# Patient Record
Sex: MALE | Race: WHITE | Employment: FULL TIME | URBAN - METROPOLITAN AREA
[De-identification: names, ages, dates, MRNs, and addresses within clinical notes are randomized per-mention and may not be internally consistent; named-entity substitution may affect disease eponyms.]

---

## 2017-10-12 ENCOUNTER — TELEPHONE (OUTPATIENT)
Dept: FAMILY MEDICINE CLINIC | Age: 32
End: 2017-10-12

## 2017-10-12 NOTE — TELEPHONE ENCOUNTER
Carlos's mom is calling in regarding scheduling a new patient appt with Dr Lupis Heredia. She said that he had seen her in the past before she was at the [address] [place] [address], [place] [address] of [facility] location. Mom, dad, sister and grandmother are all established patient. He is needing to be seen for depression and an ingrown toenail. She is wanting to know if he could be seen next week.

## 2017-10-20 ENCOUNTER — OFFICE VISIT (OUTPATIENT)
Dept: FAMILY MEDICINE CLINIC | Age: 32
End: 2017-10-20
Payer: COMMERCIAL

## 2017-10-20 VITALS
RESPIRATION RATE: 16 BRPM | DIASTOLIC BLOOD PRESSURE: 66 MMHG | HEART RATE: 96 BPM | TEMPERATURE: 97.8 F | SYSTOLIC BLOOD PRESSURE: 108 MMHG | WEIGHT: 150.8 LBS

## 2017-10-20 DIAGNOSIS — R53.83 OTHER FATIGUE: ICD-10-CM

## 2017-10-20 DIAGNOSIS — F40.10 SOCIAL ANXIETY DISORDER: Primary | ICD-10-CM

## 2017-10-20 DIAGNOSIS — L60.0 INGROWN TOENAIL: ICD-10-CM

## 2017-10-20 DIAGNOSIS — F32.A DEPRESSION, UNSPECIFIED DEPRESSION TYPE: ICD-10-CM

## 2017-10-20 PROCEDURE — 99204 OFFICE O/P NEW MOD 45 MIN: CPT | Performed by: FAMILY MEDICINE

## 2017-10-22 ASSESSMENT — ENCOUNTER SYMPTOMS
ABDOMINAL PAIN: 0
CHEST TIGHTNESS: 0
BLOOD IN STOOL: 0
EYES NEGATIVE: 1
SHORTNESS OF BREATH: 0

## 2017-10-22 NOTE — PROGRESS NOTES
Chief Complaint   Patient presents with   Rito Vu Doctor     previous physican Dr Nestor Simmons years ago,     Depression     wondering about meds    Ingrown Toenail     better now. Kathrin Newman is a 28 y.o.male      Pt presents to re-establish care. Hasn't been seen here in over 5 years. C/o depression and social anxiety. Works at [employer] and lives at home with his parents. Doesn't enjoy doing anything and keeps to himself. Doesn't interact with friends much. Worries that if he interacts with other people that they will expect things from him, so he avoids forming relationships. Strong family h/o anxiety and depression in mom, dad, sib. He gets very anxious when he needs to communicate with others. He tells of a recent experience where he was very distressed about the process of getting a passport for travel. He denies suicidal thinking. He also c/o an ingrown toenail, which he has treated himself. Was infected, but that is now resolved after using peroxide and soaking the toe. C/o excessive fatigue. Nonsmoker. Denies drug use. There is no height or weight on file to calculate BMI. Current medical problems include: There is no problem list on file for this patient. History reviewed. No pertinent past medical history. History reviewed. No pertinent surgical history. No Known Allergies     No outpatient prescriptions prior to visit. No facility-administered medications prior to visit. Family History   Problem Relation Age of Onset    Mental Illness Mother     Mental Illness Father     Mental Illness Sister          Social History   Substance Use Topics    Smoking status: Never Smoker    Smokeless tobacco: Never Used    Alcohol use Yes      Comment: daily     Single. Works at [employer]. Lives with mom,dad. Review of Systems   Constitutional: Positive for fatigue. Negative for fever and unexpected weight change.    HENT: Negative. Eyes: Negative. Respiratory: Negative for chest tightness and shortness of breath. Cardiovascular: Negative for chest pain and leg swelling. Gastrointestinal: Negative for abdominal pain and blood in stool. Genitourinary: Negative. Musculoskeletal: Negative. Skin: Negative for rash. Neurological: Negative for dizziness and headaches. Psychiatric/Behavioral: Positive for dysphoric mood. Negative for sleep disturbance and suicidal ideas. The patient is nervous/anxious. All other systems reviewed and are negative. OBJECTIVE     /66   Pulse 96   Temp 97.8 °F (36.6 °C) (Oral)   Resp 16   Wt 150 lb 12.8 oz (68.4 kg)     Physical Exam   Constitutional: He is oriented to person, place, and time. He appears well-developed and well-nourished. HENT:   Head: Normocephalic and atraumatic. Right Ear: Tympanic membrane normal.   Left Ear: Tympanic membrane normal.   Mouth/Throat: Oropharynx is clear and moist.   Eyes: Conjunctivae are normal.   Neck: Neck supple. Carotid bruit is not present. No thyromegaly present. Cardiovascular: Normal rate, regular rhythm and normal heart sounds. No murmur heard. Pulmonary/Chest: Effort normal and breath sounds normal. He has no wheezes. Abdominal: Soft. Bowel sounds are normal. There is no tenderness. Musculoskeletal: He exhibits no edema. Feet:    Lymphadenopathy:     He has no cervical adenopathy. Neurological: He is alert and oriented to person, place, and time. Skin: Skin is warm and dry. No rash noted. Psychiatric: He has a normal mood and affect. His behavior is normal.   Vitals reviewed. There is no immunization history on file for this patient. Health Maintenance   Topic Date Due    HIV screen  11/21/2001    DTaP/Tdap/Td vaccine (1 - Tdap) 11/21/2005    Flu vaccine (1) 09/01/2017         ASSESSMENT      1. Social anxiety disorder    2. Other fatigue    3.  Depression, unspecified depression

## 2017-11-12 LAB
ABSOLUTE BASO #: 0 K/UL (ref 0–0.1)
ABSOLUTE EOS #: 0.1 K/UL (ref 0.1–0.4)
ABSOLUTE LYMPH #: 1.6 K/UL (ref 0.8–5.2)
ABSOLUTE MONO #: 0.4 K/UL (ref 0.1–0.9)
ABSOLUTE NEUT #: 3.2 K/UL (ref 1.3–9.1)
ALBUMIN SERPL-MCNC: 4.5 G/DL (ref 3.2–5.3)
ALK PHOSPHATASE: 83 IU/L (ref 35–121)
ALT SERPL-CCNC: 18 IU/L (ref 5–59)
ANION GAP SERPL CALCULATED.3IONS-SCNC: 13 MMOL/L
AST SERPL-CCNC: 18 IU/L (ref 10–42)
BASOPHILS RELATIVE PERCENT: 0.6 %
BILIRUB SERPL-MCNC: 1.2 MG/DL (ref 0.2–1.3)
BUN BLDV-MCNC: 13 MG/DL (ref 10–20)
CALCIUM SERPL-MCNC: 9.4 MG/DL (ref 8.7–10.8)
CHLORIDE BLD-SCNC: 103 MMOL/L (ref 95–111)
CO2: 29 MMOL/L (ref 21–32)
CREAT SERPL-MCNC: 1.1 MG/DL (ref 0.5–1.3)
EGFR AFRICAN AMERICAN: 95
EGFR IF NONAFRICAN AMERICAN: 79
EOSINOPHILS RELATIVE PERCENT: 1.7 %
GLUCOSE: 88 MG/DL (ref 70–100)
HCT VFR BLD CALC: 47.8 % (ref 41.4–51)
HEMOGLOBIN: 16.4 G/DL (ref 13.8–17)
LYMPHOCYTE %: 30.3 %
MCH RBC QN AUTO: 32.1 PG (ref 27–34)
MCHC RBC AUTO-ENTMCNC: 34.3 G/DL (ref 31–36)
MCV RBC AUTO: 93.5 FL (ref 80–100)
MONOCYTES # BLD: 8 %
NEUTROPHILS RELATIVE PERCENT: 59.2 %
PDW BLD-RTO: 11.8 % (ref 10.8–14.8)
PLATELETS: 224 K/UL (ref 150–450)
POTASSIUM SERPL-SCNC: 3.8 MMOL/L (ref 3.5–5.4)
RBC: 5.11 M/UL (ref 4–5.5)
SODIUM BLD-SCNC: 141 MMOL/L (ref 134–147)
TOTAL PROTEIN: 6.5 G/DL (ref 5.8–8)
WBC: 5.4 K/UL (ref 3.7–10.8)

## 2017-11-13 LAB
T4 FREE: 1.12 NG/DL (ref 0.8–1.8)
TSH SERPL DL<=0.05 MIU/L-ACNC: 1.13 UIU/ML (ref 0.4–4.4)

## 2017-11-17 ENCOUNTER — OFFICE VISIT (OUTPATIENT)
Dept: FAMILY MEDICINE CLINIC | Age: 32
End: 2017-11-17
Payer: COMMERCIAL

## 2017-11-17 VITALS
HEART RATE: 80 BPM | DIASTOLIC BLOOD PRESSURE: 70 MMHG | RESPIRATION RATE: 16 BRPM | SYSTOLIC BLOOD PRESSURE: 108 MMHG | WEIGHT: 151.6 LBS | TEMPERATURE: 98.5 F

## 2017-11-17 DIAGNOSIS — F40.10 SOCIAL ANXIETY DISORDER: ICD-10-CM

## 2017-11-17 DIAGNOSIS — F32.A DEPRESSION, UNSPECIFIED DEPRESSION TYPE: ICD-10-CM

## 2017-11-17 PROCEDURE — 99213 OFFICE O/P EST LOW 20 MIN: CPT | Performed by: FAMILY MEDICINE

## 2017-11-17 RX ORDER — SERTRALINE HYDROCHLORIDE 100 MG/1
100 TABLET, FILM COATED ORAL DAILY
Qty: 30 TABLET | Refills: 5 | Status: SHIPPED | OUTPATIENT
Start: 2017-11-17 | End: 2018-08-07 | Stop reason: SDUPTHER

## 2017-11-17 NOTE — PROGRESS NOTES
Chief Complaint   Patient presents with    Depression     4 week check up,          Yoselyn Quinteros is a 28 y.o.male      Pt here for follow up of depression and social anxiety. He has not really noticed much of an improvement with the zoloft so far. He denies side effects with it. He continues to decline counseling. He admits that he has not made an effort to get out of the house or socialize other than what he has to do for work. He would like to increase the dose of his medication some. Nonsmoker. Review of Systems   Constitutional: Negative. HENT: Negative. Respiratory: Negative. Cardiovascular: Negative. Gastrointestinal: Negative. Psychiatric/Behavioral: Positive for dysphoric mood and sleep disturbance. Negative for suicidal ideas. The patient is nervous/anxious. All other systems reviewed and are negative. OBJECTIVE     /70   Pulse 80   Temp 98.5 °F (36.9 °C) (Oral)   Resp 16   Wt 151 lb 9.6 oz (68.8 kg)      Wt Readings from Last 3 Encounters:   11/17/17 151 lb 9.6 oz (68.8 kg)   10/20/17 150 lb 12.8 oz (68.4 kg)         Physical Exam   Psychiatric: His speech is normal and behavior is normal. Judgment and thought content normal. His affect is blunt. He exhibits a depressed mood. ASSESSMENT      1. Social anxiety disorder    2. Depression, unspecified depression type        PLAN     Requested Prescriptions     Signed Prescriptions Disp Refills    sertraline (ZOLOFT) 100 MG tablet 30 tablet 5     Sig: Take 1 tablet by mouth daily     Increase zoloft to 100mg daily    He declines formal counseling. Greater than 50% of this 15 min visit spent on counseling and coordination of care. I encouraged him to try to get out of the house a little more. Return in about 4 weeks (around 12/15/2017).               Electronically signed by Maya Whalen MD on 11/17/2017 at 4:58 PM

## 2017-11-19 ASSESSMENT — ENCOUNTER SYMPTOMS
GASTROINTESTINAL NEGATIVE: 1
RESPIRATORY NEGATIVE: 1

## 2018-01-05 ENCOUNTER — OFFICE VISIT (OUTPATIENT)
Dept: FAMILY MEDICINE CLINIC | Age: 33
End: 2018-01-05
Payer: COMMERCIAL

## 2018-01-05 VITALS
WEIGHT: 148 LBS | TEMPERATURE: 98.4 F | HEART RATE: 80 BPM | RESPIRATION RATE: 16 BRPM | DIASTOLIC BLOOD PRESSURE: 84 MMHG | SYSTOLIC BLOOD PRESSURE: 118 MMHG

## 2018-01-05 DIAGNOSIS — F32.A DEPRESSION, UNSPECIFIED DEPRESSION TYPE: ICD-10-CM

## 2018-01-05 DIAGNOSIS — F40.10 SOCIAL ANXIETY DISORDER: Primary | ICD-10-CM

## 2018-01-05 PROCEDURE — 99213 OFFICE O/P EST LOW 20 MIN: CPT | Performed by: FAMILY MEDICINE

## 2018-01-05 RX ORDER — ASPIRIN 325 MG
325 TABLET ORAL PRN
COMMUNITY

## 2018-01-05 ASSESSMENT — PATIENT HEALTH QUESTIONNAIRE - PHQ9
2. FEELING DOWN, DEPRESSED OR HOPELESS: 1
SUM OF ALL RESPONSES TO PHQ9 QUESTIONS 1 & 2: 2
SUM OF ALL RESPONSES TO PHQ QUESTIONS 1-9: 2
1. LITTLE INTEREST OR PLEASURE IN DOING THINGS: 1

## 2018-01-05 NOTE — PROGRESS NOTES
Chief Complaint   Patient presents with    1 Month Follow-Up     Here today for 1 month f/up for social anxiety and depression. Pt states the increased dose of Zoloft is helping. Soto Shah is a 33 y.o.male      Here for follow up of chronic health problems including:    Patient Active Problem List   Diagnosis    Social anxiety disorder    Depression     He feels like the zoloft is helping for his anxiety and depression. Has a little more motivation and his mood is improved some. Denies side effects from the meds. Socializing with family a little more. Sleeps ok. Would like to continue the med for now. Declines referral for formalized counseling. Review of Systems   Constitutional: Negative for appetite change and unexpected weight change. HENT: Negative. Respiratory: Negative. Cardiovascular: Negative. Gastrointestinal: Negative. Neurological: Negative for dizziness and headaches. Psychiatric/Behavioral: Positive for agitation (improved) and dysphoric mood. Negative for sleep disturbance and suicidal ideas. The patient is nervous/anxious (improved). All other systems reviewed and are negative. OBJECTIVE     /84 (Site: Left Arm)   Pulse 80   Temp 98.4 °F (36.9 °C) (Oral)   Resp 16   Wt 148 lb (67.1 kg)     Wt Readings from Last 3 Encounters:   01/05/18 148 lb (67.1 kg)   11/17/17 151 lb 9.6 oz (68.8 kg)   10/20/17 150 lb 12.8 oz (68.4 kg)       Physical Exam   Constitutional: He appears well-developed and well-nourished. HENT:   Right Ear: Tympanic membrane normal.   Left Ear: Tympanic membrane normal.   Mouth/Throat: Oropharynx is clear and moist.   Cardiovascular: Normal rate and regular rhythm. No murmur heard. Pulmonary/Chest: Breath sounds normal. He has no wheezes. Musculoskeletal: He exhibits no edema. Lymphadenopathy:     He has no cervical adenopathy.                There is no immunization history on file for this patient. Health Maintenance   Topic Date Due    HIV screen  11/21/2001    DTaP/Tdap/Td vaccine (1 - Tdap) 11/21/2005    Flu vaccine (1) 09/01/2017       Future Appointments  Date Time Provider Marjorie Davies   7/10/2018 3:15 PM Sergio Ho MD [address]      1. Social anxiety disorder    2. Depression, unspecified depression type        PLAN        Continue Zoloft 100mg daily    Increase exercise and activity. Work on getting out of the house a little more. Kyaw Sabillon received counseling on the following healthy behaviors: medication adherence     Discussed use, benefit, and side effects of prescribed medications. Barriers to medication compliance addressed. All patient questions answered. Pt voiced understanding. Return in about 6 months (around 7/5/2018).           Electronically signed by Sergio Ho MD on 1/5/2018 at 4:57 PM

## 2018-01-06 ASSESSMENT — ENCOUNTER SYMPTOMS
GASTROINTESTINAL NEGATIVE: 1
RESPIRATORY NEGATIVE: 1

## 2018-08-07 ENCOUNTER — OFFICE VISIT (OUTPATIENT)
Dept: FAMILY MEDICINE CLINIC | Age: 33
End: 2018-08-07
Payer: COMMERCIAL

## 2018-08-07 VITALS
RESPIRATION RATE: 16 BRPM | DIASTOLIC BLOOD PRESSURE: 76 MMHG | SYSTOLIC BLOOD PRESSURE: 116 MMHG | TEMPERATURE: 98.5 F | HEART RATE: 88 BPM | WEIGHT: 145.6 LBS

## 2018-08-07 DIAGNOSIS — F32.A DEPRESSION, UNSPECIFIED DEPRESSION TYPE: ICD-10-CM

## 2018-08-07 DIAGNOSIS — F40.10 SOCIAL ANXIETY DISORDER: ICD-10-CM

## 2018-08-07 PROCEDURE — 99213 OFFICE O/P EST LOW 20 MIN: CPT | Performed by: FAMILY MEDICINE

## 2018-08-07 RX ORDER — SERTRALINE HYDROCHLORIDE 100 MG/1
150 TABLET, FILM COATED ORAL DAILY
Qty: 135 TABLET | Refills: 3 | Status: SHIPPED | OUTPATIENT
Start: 2018-08-07 | End: 2018-11-13

## 2018-08-07 ASSESSMENT — ENCOUNTER SYMPTOMS
RESPIRATORY NEGATIVE: 1
GASTROINTESTINAL NEGATIVE: 1

## 2018-08-08 NOTE — PROGRESS NOTES
Ida Pierson MD [address]      1. Social anxiety disorder    2. Depression, unspecified depression type        PLAN        Requested Prescriptions     Signed Prescriptions Disp Refills    sertraline (ZOLOFT) 100 MG tablet 135 tablet 3     Sig: Take 1.5 tablets by mouth daily     Increase zoloft as above    Recommended healthy diet, exercise and adequate rest    Increase socialization some    Anna Gut received counseling on the following healthy behaviors: nutrition, exercise and medication adherence. Discussed use, benefit, and side effects of prescribed medications. Barriers to medication compliance addressed. All patient questions answered. Pt voiced understanding. Greater than 50% of this 15 min visit spent on counseling and coordination of care. Return in about 6 months (around 2/7/2019).           Electronically signed by Ida Pierson MD on 8/7/2018 at 8:29 PM

## 2018-08-18 DIAGNOSIS — F40.10 SOCIAL ANXIETY DISORDER: ICD-10-CM

## 2018-08-18 DIAGNOSIS — F32.A DEPRESSION, UNSPECIFIED DEPRESSION TYPE: ICD-10-CM

## 2018-08-20 RX ORDER — SERTRALINE HYDROCHLORIDE 100 MG/1
100 TABLET, FILM COATED ORAL DAILY
Qty: 30 TABLET | Refills: 5 | OUTPATIENT
Start: 2018-08-20

## 2018-11-13 ENCOUNTER — OFFICE VISIT (OUTPATIENT)
Dept: FAMILY MEDICINE CLINIC | Age: 33
End: 2018-11-13
Payer: COMMERCIAL

## 2018-11-13 VITALS
WEIGHT: 151 LBS | TEMPERATURE: 98.1 F | RESPIRATION RATE: 16 BRPM | DIASTOLIC BLOOD PRESSURE: 84 MMHG | HEART RATE: 80 BPM | SYSTOLIC BLOOD PRESSURE: 110 MMHG

## 2018-11-13 DIAGNOSIS — F40.10 SOCIAL ANXIETY DISORDER: ICD-10-CM

## 2018-11-13 DIAGNOSIS — R53.83 OTHER FATIGUE: ICD-10-CM

## 2018-11-13 DIAGNOSIS — F32.A DEPRESSION, UNSPECIFIED DEPRESSION TYPE: Primary | ICD-10-CM

## 2018-11-13 PROCEDURE — 99213 OFFICE O/P EST LOW 20 MIN: CPT | Performed by: FAMILY MEDICINE

## 2018-11-13 RX ORDER — VENLAFAXINE HYDROCHLORIDE 75 MG/1
75 CAPSULE, EXTENDED RELEASE ORAL DAILY
Qty: 30 CAPSULE | Refills: 5 | Status: SHIPPED | OUTPATIENT
Start: 2018-11-13 | End: 2019-01-22 | Stop reason: ALTCHOICE

## 2018-11-14 ASSESSMENT — ENCOUNTER SYMPTOMS
RESPIRATORY NEGATIVE: 1
GASTROINTESTINAL NEGATIVE: 1

## 2018-11-14 NOTE — PROGRESS NOTES
CBC, CMP, TSH, free T4, testosterone level. Return in about 6 weeks (around 12/25/2018).               Electronically signed by Vika Ricci MD on 11/13/2018 at 11:18 PM

## 2018-12-19 ENCOUNTER — TELEPHONE (OUTPATIENT)
Dept: FAMILY MEDICINE CLINIC | Age: 33
End: 2018-12-19

## 2018-12-19 NOTE — TELEPHONE ENCOUNTER
Patient is not happy with new medication, seeing if he could go back on previous medication? Patient still has some from before and has some till next appt. Patient has appt 1/22/19, r/s from 12/21/18    Please advise, thank you!

## 2018-12-19 NOTE — TELEPHONE ENCOUNTER
Ok to go back to zoloft 50mg daily. Follow up with me as planned. Call for any problems in the meantime.   CG

## 2019-01-22 ENCOUNTER — OFFICE VISIT (OUTPATIENT)
Dept: FAMILY MEDICINE CLINIC | Age: 34
End: 2019-01-22
Payer: COMMERCIAL

## 2019-01-22 VITALS
WEIGHT: 150.8 LBS | TEMPERATURE: 98.5 F | BODY MASS INDEX: 21.11 KG/M2 | RESPIRATION RATE: 16 BRPM | DIASTOLIC BLOOD PRESSURE: 82 MMHG | SYSTOLIC BLOOD PRESSURE: 120 MMHG | HEART RATE: 88 BPM | HEIGHT: 71 IN

## 2019-01-22 DIAGNOSIS — F32.A DEPRESSION, UNSPECIFIED DEPRESSION TYPE: ICD-10-CM

## 2019-01-22 DIAGNOSIS — F40.10 SOCIAL ANXIETY DISORDER: Primary | ICD-10-CM

## 2019-01-22 PROCEDURE — 99213 OFFICE O/P EST LOW 20 MIN: CPT | Performed by: FAMILY MEDICINE

## 2019-01-22 RX ORDER — SERTRALINE HYDROCHLORIDE 100 MG/1
150 TABLET, FILM COATED ORAL DAILY
COMMUNITY
End: 2019-02-20 | Stop reason: SDUPTHER

## 2019-01-23 ASSESSMENT — ENCOUNTER SYMPTOMS
RESPIRATORY NEGATIVE: 1
GASTROINTESTINAL NEGATIVE: 1

## 2019-02-22 RX ORDER — SERTRALINE HYDROCHLORIDE 100 MG/1
150 TABLET, FILM COATED ORAL DAILY
Qty: 135 TABLET | Refills: 3 | Status: SHIPPED | OUTPATIENT
Start: 2019-02-22 | End: 2020-02-27 | Stop reason: SDUPTHER

## 2019-07-29 ENCOUNTER — TELEPHONE (OUTPATIENT)
Dept: FAMILY MEDICINE CLINIC | Age: 34
End: 2019-07-29

## 2019-07-29 ENCOUNTER — OFFICE VISIT (OUTPATIENT)
Dept: FAMILY MEDICINE CLINIC | Age: 34
End: 2019-07-29
Payer: COMMERCIAL

## 2019-07-29 VITALS
HEART RATE: 92 BPM | RESPIRATION RATE: 16 BRPM | SYSTOLIC BLOOD PRESSURE: 124 MMHG | DIASTOLIC BLOOD PRESSURE: 78 MMHG | BODY MASS INDEX: 21.5 KG/M2 | WEIGHT: 152 LBS

## 2019-07-29 DIAGNOSIS — Z56.6 WORK-RELATED STRESS: ICD-10-CM

## 2019-07-29 DIAGNOSIS — F41.9 ANXIETY: Primary | ICD-10-CM

## 2019-07-29 PROCEDURE — 99214 OFFICE O/P EST MOD 30 MIN: CPT | Performed by: FAMILY MEDICINE

## 2019-07-29 SDOH — HEALTH STABILITY - MENTAL HEALTH: OTHER PHYSICAL AND MENTAL STRAIN RELATED TO WORK: Z56.6

## 2019-07-29 ASSESSMENT — ENCOUNTER SYMPTOMS
VOMITING: 1
RESPIRATORY NEGATIVE: 1

## 2019-07-29 NOTE — TELEPHONE ENCOUNTER
Spoke to the pts mother and scheduled the pt an appt with CG today at 2:45 PM in [facility]. His mother had an appt at that time and she rescheduled her appt so the pt could be seen.

## 2019-07-30 ENCOUNTER — TELEPHONE (OUTPATIENT)
Dept: FAMILY MEDICINE CLINIC | Age: 34
End: 2019-07-30

## 2019-07-30 NOTE — PROGRESS NOTES
Chief Complaint   Patient presents with    Anxiety     increased anx and stress from work         Xenia Santacruz is a 29 y.o.male      Pt here with his mother today due to worsening stress and anxiety related to work. 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 He left work last week and was so anxious that he vomited all night at home. He has a h/o significant social anxiety. He admits that he hasn't consistently taken his zoloft recently. He has not been back to work since this happened late last week. He isn't sure that he's going to be able to do the job that they want him to do at work. He is willing to do some counseling. There is a strong family history of anxiety, depression and mental illness in the family. Nonsmoker. Body mass index is 21.5 kg/m². Review of Systems   Constitutional: Negative. HENT: Negative. Respiratory: Negative. Cardiovascular: Negative. Gastrointestinal: Positive for vomiting. Genitourinary: Negative. Musculoskeletal: Negative. Psychiatric/Behavioral: Positive for behavioral problems and dysphoric mood. Negative for suicidal ideas. The patient is nervous/anxious. All other systems reviewed and are negative. OBJECTIVE     /78   Pulse 92   Resp 16   Wt 152 lb (68.9 kg)   BMI 21.50 kg/m²       Wt Readings from Last 3 Encounters:   07/29/19 152 lb (68.9 kg)   01/22/19 150 lb 12.8 oz (68.4 kg)   11/13/18 151 lb (68.5 kg)       Physical Exam   Constitutional: He appears well-developed and well-nourished. Psychiatric: Judgment and thought content normal. His mood appears anxious. His speech is delayed. He is slowed. Cognition and memory are normal. He exhibits a depressed mood. ASSESSMENT      1. Anxiety    2. Work-related stress        PLAN     Refer to psychology    Off work x 1 week. He will address his work situation with his supervisor.     Take zoloft consistently      Orders Placed This Encounter   Procedures    Amb External Referral To Psychology     Referral Priority:   Routine     Referral Type:   Consult for Advice and Opinion     Requested Specialty:   Psychology     Number of Visits Requested:   1       Return in about 1 week (around 8/5/2019). Greater than 50% of this 25 min visit spent on counseling and coordination of care.               Electronically signed by Sean Hall MD on 7/29/2019 at 8:45 PM

## 2019-07-30 NOTE — TELEPHONE ENCOUNTER
Diana Edwards calling stating she will contact the pt to schedule an appt. Please fax over notes to [phone]. Notes and referral faxed.

## 2019-08-05 ENCOUNTER — OFFICE VISIT (OUTPATIENT)
Dept: FAMILY MEDICINE CLINIC | Age: 34
End: 2019-08-05
Payer: COMMERCIAL

## 2019-08-05 VITALS
RESPIRATION RATE: 12 BRPM | WEIGHT: 151 LBS | SYSTOLIC BLOOD PRESSURE: 134 MMHG | DIASTOLIC BLOOD PRESSURE: 72 MMHG | BODY MASS INDEX: 21.36 KG/M2 | HEART RATE: 86 BPM

## 2019-08-05 DIAGNOSIS — F41.9 ANXIETY: Primary | ICD-10-CM

## 2019-08-05 DIAGNOSIS — F40.10 SOCIAL ANXIETY DISORDER: ICD-10-CM

## 2019-08-05 DIAGNOSIS — Z56.6 WORK-RELATED STRESS: ICD-10-CM

## 2019-08-05 PROCEDURE — 99213 OFFICE O/P EST LOW 20 MIN: CPT | Performed by: FAMILY MEDICINE

## 2019-08-05 RX ORDER — BUSPIRONE HYDROCHLORIDE 10 MG/1
10 TABLET ORAL 2 TIMES DAILY
Qty: 60 TABLET | Refills: 2 | Status: SHIPPED | OUTPATIENT
Start: 2019-08-05 | End: 2019-10-21 | Stop reason: SDUPTHER

## 2019-08-05 SDOH — HEALTH STABILITY - MENTAL HEALTH: OTHER PHYSICAL AND MENTAL STRAIN RELATED TO WORK: Z56.6

## 2019-08-05 ASSESSMENT — ENCOUNTER SYMPTOMS
RESPIRATORY NEGATIVE: 1
GASTROINTESTINAL NEGATIVE: 1

## 2019-08-05 NOTE — PROGRESS NOTES
Chief Complaint   Patient presents with    Anxiety     1 week fu, patient still feels like he is struggling with his anxiety         SUBJECTIVE     Fara Santiago is a 29 y.o.male      Pt here for follow up of anxiety and social anxiety. Has started counseling with Swapna Medina and is taking his zoloft daily now. He is not noticing any difference in his anxiety. Unable to sit still today. He is here with his mother who would like for him to take Buspar along with his zoloft. He isn't back to work yet and feels that he isn't ready to do so yet. Needs paperwork filled out again. Review of Systems   Constitutional: Negative for fatigue, fever and unexpected weight change. HENT: Negative. Respiratory: Negative. Cardiovascular: Negative. Gastrointestinal: Negative. Psychiatric/Behavioral: Positive for dysphoric mood. The patient is nervous/anxious. All other systems reviewed and are negative. OBJECTIVE     /72   Pulse 86   Resp 12   Wt 151 lb (68.5 kg)   BMI 21.36 kg/m²     Physical Exam   Constitutional: He appears well-developed and well-nourished. Psychiatric: Judgment and thought content normal. His mood appears anxious. His speech is delayed. He is hyperactive. Cognition and memory are normal.           ASSESSMENT      1. Anxiety    2. Social anxiety disorder    3. Work-related stress        PLAN     Requested Prescriptions     Signed Prescriptions Disp Refills    busPIRone (BUSPAR) 10 MG tablet 60 tablet 2     Sig: Take 1 tablet by mouth 2 times daily     Continue Zoloft at current dose    Continue weekly counseling    Off work for 2 more weeks. Will need referral to psychiatry is not improving soon. Return in about 2 weeks (around 8/19/2019). Greater than 50% of this 15 min visit spent on counseling and coordination of care.               Electronically signed by Kieran Stoll MD on 8/5/2019 at 4:58 PM

## 2019-08-14 ENCOUNTER — TELEPHONE (OUTPATIENT)
Dept: FAMILY MEDICINE CLINIC | Age: 34
End: 2019-08-14

## 2019-08-14 NOTE — TELEPHONE ENCOUNTER
Need more information about where he's at with his anxiety before I can write him a return to work slip. He has a scheduled appt for 8/19/19.  CG

## 2019-08-19 ENCOUNTER — OFFICE VISIT (OUTPATIENT)
Dept: FAMILY MEDICINE CLINIC | Age: 34
End: 2019-08-19
Payer: COMMERCIAL

## 2019-08-19 VITALS
DIASTOLIC BLOOD PRESSURE: 74 MMHG | HEART RATE: 76 BPM | HEIGHT: 71 IN | WEIGHT: 155.2 LBS | RESPIRATION RATE: 16 BRPM | SYSTOLIC BLOOD PRESSURE: 120 MMHG | BODY MASS INDEX: 21.73 KG/M2

## 2019-08-19 DIAGNOSIS — F40.10 SOCIAL ANXIETY DISORDER: Primary | ICD-10-CM

## 2019-08-19 PROCEDURE — 99213 OFFICE O/P EST LOW 20 MIN: CPT | Performed by: FAMILY MEDICINE

## 2019-08-19 ASSESSMENT — ENCOUNTER SYMPTOMS
RESPIRATORY NEGATIVE: 1
GASTROINTESTINAL NEGATIVE: 1

## 2019-08-19 NOTE — PROGRESS NOTES
Visit Information    Have you changed or started any medications since your last visit including any over-the-counter medicines, vitamins, or herbal medicines? no   Are you having any side effects from any of your medications? -  no  Have you stopped taking any of your medications? Is so, why? -  no    Have you seen any other physician or provider since your last visit? No  Have you had any other diagnostic tests since your last visit? No  Have you been seen in the emergency room and/or had an admission to a hospital since we last saw you? No  Have you had your routine dental cleaning in the past 6 months? yes - 1/2019    Have you activated your [facility] account? If not, what are your barriers?  Yes     Patient Care Team:  Hieu Gant MD as PCP - General (Family Medicine)  Hieu Gant MD as PCP - [facility] Provider    Medical History Review  Past Medical, Family, and Social History reviewed and does contribute to the patient presenting condition    Health Maintenance   Topic Date Due    Varicella Vaccine (1 of 2 - 13+ 2-dose series) 11/21/1999    HIV screen  11/21/2001    DTaP/Tdap/Td vaccine (1 - Tdap) 11/21/2005    Flu vaccine (1) 10/01/2019 (Originally 9/1/2019)    Pneumococcal 0-64 years Vaccine  Aged Out

## 2019-10-21 ENCOUNTER — OFFICE VISIT (OUTPATIENT)
Dept: FAMILY MEDICINE CLINIC | Age: 34
End: 2019-10-21
Payer: COMMERCIAL

## 2019-10-21 VITALS
DIASTOLIC BLOOD PRESSURE: 70 MMHG | BODY MASS INDEX: 22.37 KG/M2 | WEIGHT: 158.2 LBS | RESPIRATION RATE: 12 BRPM | SYSTOLIC BLOOD PRESSURE: 122 MMHG | HEART RATE: 72 BPM

## 2019-10-21 DIAGNOSIS — Z23 NEED FOR TDAP VACCINATION: ICD-10-CM

## 2019-10-21 DIAGNOSIS — F40.10 SOCIAL ANXIETY DISORDER: Primary | ICD-10-CM

## 2019-10-21 DIAGNOSIS — F41.9 ANXIETY: ICD-10-CM

## 2019-10-21 DIAGNOSIS — Z56.6 WORK-RELATED STRESS: ICD-10-CM

## 2019-10-21 PROCEDURE — 90471 IMMUNIZATION ADMIN: CPT | Performed by: FAMILY MEDICINE

## 2019-10-21 PROCEDURE — 99213 OFFICE O/P EST LOW 20 MIN: CPT | Performed by: FAMILY MEDICINE

## 2019-10-21 PROCEDURE — 90715 TDAP VACCINE 7 YRS/> IM: CPT | Performed by: FAMILY MEDICINE

## 2019-10-21 RX ORDER — BUSPIRONE HYDROCHLORIDE 10 MG/1
10 TABLET ORAL 2 TIMES DAILY
Qty: 180 TABLET | Refills: 3 | Status: SHIPPED | OUTPATIENT
Start: 2019-10-21 | End: 2020-08-25 | Stop reason: SDUPTHER

## 2019-10-21 SDOH — HEALTH STABILITY - MENTAL HEALTH: OTHER PHYSICAL AND MENTAL STRAIN RELATED TO WORK: Z56.6

## 2019-10-21 ASSESSMENT — ENCOUNTER SYMPTOMS
RESPIRATORY NEGATIVE: 1
GASTROINTESTINAL NEGATIVE: 1

## 2020-02-27 RX ORDER — SERTRALINE HYDROCHLORIDE 100 MG/1
150 TABLET, FILM COATED ORAL DAILY
Qty: 135 TABLET | Refills: 2 | Status: SHIPPED | OUTPATIENT
Start: 2020-02-27 | End: 2021-01-07 | Stop reason: SDUPTHER

## 2020-02-27 NOTE — TELEPHONE ENCOUNTER
This medication refill is regarding a refill  Refill requested by [address] [place] [address]     Requested Prescriptions     Pending Prescriptions Disp Refills    sertraline (ZOLOFT) 100 MG tablet 135 tablet 3     Sig: Take 1.5 tablets by mouth daily       Date of last visit: 1/22/2019  Date of next visit: Visit date not found  Date of last refill: 2/22/19  Pharmacy Name: [facility] [address]

## 2020-08-25 RX ORDER — BUSPIRONE HYDROCHLORIDE 10 MG/1
10 TABLET ORAL 2 TIMES DAILY
Qty: 180 TABLET | Refills: 0 | Status: SHIPPED | OUTPATIENT
Start: 2020-08-25 | End: 2021-01-07 | Stop reason: SDUPTHER

## 2020-08-25 NOTE — TELEPHONE ENCOUNTER
This medication refill is regarding a refill  Refill requested by [facility] [address]   Requested Prescriptions     Pending Prescriptions Disp Refills    busPIRone (BUSPAR) 10 MG tablet 180 tablet 3     Sig: Take 1 tablet by mouth 2 times daily       Date of last visit: 1/22/2019  Date of next visit: Visit date not found  Date of last refill: 10/21/20  Pharm[facility] Name: [facility]

## 2020-12-22 RX ORDER — BUSPIRONE HYDROCHLORIDE 10 MG/1
TABLET ORAL
Qty: 180 TABLET | Refills: 0 | OUTPATIENT
Start: 2020-12-22

## 2020-12-22 RX ORDER — SERTRALINE HYDROCHLORIDE 100 MG/1
TABLET, FILM COATED ORAL
Qty: 135 TABLET | Refills: 2 | OUTPATIENT
Start: 2020-12-22

## 2020-12-22 NOTE — TELEPHONE ENCOUNTER
EP refill request from [facility]  for refills on zoloft and buspar  Last seen 10/21/19  Next appt no future   Order pending      Called pt. appt made for 1/7/21.    He states he has enough meds until appt  refill refused

## 2021-01-07 ENCOUNTER — OFFICE VISIT (OUTPATIENT)
Dept: FAMILY MEDICINE CLINIC | Age: 36
End: 2021-01-07
Payer: COMMERCIAL

## 2021-01-07 VITALS
WEIGHT: 161.2 LBS | HEART RATE: 105 BPM | DIASTOLIC BLOOD PRESSURE: 80 MMHG | SYSTOLIC BLOOD PRESSURE: 122 MMHG | TEMPERATURE: 96.4 F | OXYGEN SATURATION: 98 % | BODY MASS INDEX: 22.8 KG/M2

## 2021-01-07 DIAGNOSIS — Z00.00 ANNUAL PHYSICAL EXAM: Primary | ICD-10-CM

## 2021-01-07 DIAGNOSIS — Z56.6 WORK-RELATED STRESS: ICD-10-CM

## 2021-01-07 DIAGNOSIS — F41.9 ANXIETY: ICD-10-CM

## 2021-01-07 DIAGNOSIS — F40.10 SOCIAL ANXIETY DISORDER: ICD-10-CM

## 2021-01-07 PROCEDURE — 99395 PREV VISIT EST AGE 18-39: CPT | Performed by: FAMILY MEDICINE

## 2021-01-07 RX ORDER — BUSPIRONE HYDROCHLORIDE 10 MG/1
10 TABLET ORAL 2 TIMES DAILY
Qty: 180 TABLET | Refills: 3 | Status: SHIPPED | OUTPATIENT
Start: 2021-01-07 | End: 2021-08-20

## 2021-01-07 RX ORDER — SERTRALINE HYDROCHLORIDE 100 MG/1
150 TABLET, FILM COATED ORAL DAILY
Qty: 135 TABLET | Refills: 3 | Status: SHIPPED | OUTPATIENT
Start: 2021-01-07 | End: 2021-08-20

## 2021-01-07 SDOH — HEALTH STABILITY - MENTAL HEALTH: OTHER PHYSICAL AND MENTAL STRAIN RELATED TO WORK: Z56.6

## 2021-01-07 ASSESSMENT — ENCOUNTER SYMPTOMS
EYES NEGATIVE: 1
ABDOMINAL PAIN: 0
BLOOD IN STOOL: 0
SHORTNESS OF BREATH: 0

## 2021-01-07 NOTE — PROGRESS NOTES
2021    Masha Qiu (:  1985) is a 30 y.o. male, here for a preventive medicine evaluation. Patient Active Problem List   Diagnosis    Social anxiety disorder    Depression     Doing well. He states that his depression and anxiety stay pretty stable as long as he sticks to a regular schedule. He takes his medication pretty consistently, but notes that he can sometimes get away with just taking Buspar in the morning. He denies side effects from the meds. He continues to work at [employer]. He is working from home a couple days per week. Not getting much exercise and his weight is up a little. No changes in family history. Nonsmoker. Body mass index is 22.8 kg/m². Review of Systems   Constitutional: Positive for unexpected weight change (gain). Negative for chills, fatigue and fever. HENT: Negative. Negative for hearing loss. Eyes: Negative. Negative for visual disturbance. Respiratory: Negative for shortness of breath. Cardiovascular: Negative. Gastrointestinal: Negative for abdominal pain and blood in stool. Genitourinary: Negative for dysuria. Musculoskeletal: Negative. Skin: Negative for rash. Neurological: Negative for dizziness. Psychiatric/Behavioral: Negative. Negative for dysphoric mood and sleep disturbance. The patient is not nervous/anxious. All other systems reviewed and are negative. Prior to Visit Medications    Medication Sig Taking? Authorizing Provider   busPIRone (BUSPAR) 10 MG tablet Take 1 tablet by mouth 2 times daily Yes Harlan Robin MD   sertraline (ZOLOFT) 100 MG tablet Take 1.5 tablets by mouth daily Yes Harlan Robin MD   aspirin 325 MG tablet Take 325 mg by mouth as needed for Pain Yes Historical Provider, MD        No Known Allergies    History reviewed. No pertinent past medical history. History reviewed. No pertinent surgical history.     Social History     Socioeconomic History    Marital status: Single     Spouse name: Not on file    Number of children: Not on file    Years of education: Not on file    Highest education level: Not on file   Occupational History    Not on file   Social Needs    Financial resource strain: Not on file    Food insecurity     Worry: Not on file     Inability: Not on file    Transportation needs     Medical: Not on file     Non-medical: Not on file   Tobacco Use    Smoking status: Never Smoker    Smokeless tobacco: Never Used   Substance and Sexual Activity    Alcohol use: Yes     Comment: daily    Drug use: No    Sexual activity: Not on file   Lifestyle    Physical activity     Days per week: Not on file     Minutes per session: Not on file    Stress: Not on file   Relationships    Social connections     Talks on phone: Not on file     Gets together: Not on file     Attends Latter-day service: Not on file     Active member of club or organization: Not on file     Attends meetings of clubs or organizations: Not on file     Relationship status: Not on file    Intimate partner violence     Fear of current or ex partner: Not on file     Emotionally abused: Not on file     Physically abused: Not on file     Forced sexual activity: Not on file   Other Topics Concern    Not on file   Social History Narrative    Not on file        Family History   Problem Relation Age of Onset    Mental Illness Mother     Mental Illness Father     Mental Illness Sister        ADVANCE DIRECTIVE: N, <no information>    Vitals:    01/07/21 1450   BP: 122/80   Site: Left Upper Arm   Pulse: 105   Temp: 96.4 °F (35.8 °C)   TempSrc: Temporal   SpO2: 98%   Weight: 161 lb 3.2 oz (73.1 kg)     Estimated body mass index is 22.8 kg/m² as calculated from the following:    Height as of 8/19/19: 5' 10.51\" (1.791 m). Weight as of this encounter: 161 lb 3.2 oz (73.1 kg).     Wt Readings from Last 3 Encounters:   01/07/21 161 lb 3.2 oz (73.1 kg)   10/21/19 158 lb 3.2 oz (71.8 kg)   08/19/19 155 lb 3.2 oz (70.4 kg)       Physical Exam  Vitals signs reviewed. Constitutional:       General: He is not in acute distress. Appearance: He is well-developed. HENT:      Head: Normocephalic and atraumatic. Right Ear: Tympanic membrane normal.      Left Ear: Tympanic membrane normal.      Mouth/Throat:      Mouth: Mucous membranes are moist.      Pharynx: No posterior oropharyngeal erythema. Eyes:      Conjunctiva/sclera: Conjunctivae normal.   Neck:      Musculoskeletal: Neck supple. Thyroid: No thyromegaly. Cardiovascular:      Rate and Rhythm: Normal rate and regular rhythm. Heart sounds: No murmur. Pulmonary:      Effort: Pulmonary effort is normal.      Breath sounds: Normal breath sounds. No wheezing. Abdominal:      General: Bowel sounds are normal.      Palpations: Abdomen is soft. Tenderness: There is no abdominal tenderness. Musculoskeletal:      Right lower leg: No edema. Left lower leg: No edema. Lymphadenopathy:      Cervical: No cervical adenopathy. Skin:     General: Skin is warm and dry. Findings: No rash. Neurological:      Mental Status: He is alert and oriented to person, place, and time.    Psychiatric:         Behavior: Behavior normal.         Immunization History   Administered Date(s) Administered    DTP 01/19/1987, 03/27/1987, 05/22/1987, 05/23/1988, 03/27/1992    Hepatitis B Ped/Adol (Engerix-B, Recombivax HB) 08/26/2004, 10/20/2004, 03/02/2005    Hib vaccine 11/21/1988    Influenza A ([id number]) Vaccine Nasal 01/26/2010    MMR 02/22/1988, 05/18/2000    Polio OPV 01/19/1987, 03/27/1987, 05/22/1987, 03/27/1992    Td vaccine (adult) 06/30/1998    Tdap (Boostrix, Adacel) 10/21/2019       Health Maintenance   Topic Date Due    Hepatitis C screen  1985    HIV screen  11/21/2001    Flu vaccine (1) 01/07/2022 (Originally 9/1/2020)    DTaP/Tdap/Td vaccine (7 - Td) 10/21/2029    Hepatitis B vaccine  Completed    Hib vaccine  Completed  Hepatitis A vaccine  Aged Out    Meningococcal (ACWY) vaccine  Aged Out    Pneumococcal 0-64 years Vaccine  Aged Out    Varicella vaccine  Discontinued       ASSESSMENT/PLAN:    1. Annual physical exam    Suggested healthy diet and exercise to improve overall health. He is at a normal BMI. He declines flu vaccine today. 2. Anxiety    Continue current meds  -     busPIRone (BUSPAR) 10 MG tablet; Take 1 tablet by mouth 2 times daily, Disp-180 tablet, R-3Normal  -     sertraline (ZOLOFT) 100 MG tablet; Take 1.5 tablets by mouth daily, Disp-135 tablet, R-3Normal    3. Social anxiety disorder    Continue current meds  -     busPIRone (BUSPAR) 10 MG tablet; Take 1 tablet by mouth 2 times daily, Disp-180 tablet, R-3Normal  -     sertraline (ZOLOFT) 100 MG tablet; Take 1.5 tablets by mouth daily, Disp-135 tablet, R-3Normal  4. Work-related stress    -     busPIRone (BUSPAR) 10 MG tablet; Take 1 tablet by mouth 2 times daily, Disp-180 tablet, R-3Normal  -     sertraline (ZOLOFT) 100 MG tablet; Take 1.5 tablets by mouth daily, Disp-135 tablet, R-3Normal      Follow up yearly and prn    An electronic signature was used to authenticate this note.         Electronically signed by Stephanie Cote MD on 1/7/2021 at 3:13 PM

## 2021-08-20 ENCOUNTER — OFFICE VISIT (OUTPATIENT)
Dept: FAMILY MEDICINE CLINIC | Age: 36
End: 2021-08-20
Payer: COMMERCIAL

## 2021-08-20 ENCOUNTER — OFFICE VISIT (OUTPATIENT)
Dept: BEHAVIORAL/MENTAL HEALTH CLINIC | Age: 36
End: 2021-08-20
Payer: COMMERCIAL

## 2021-08-20 VITALS
WEIGHT: 156 LBS | SYSTOLIC BLOOD PRESSURE: 114 MMHG | HEIGHT: 70 IN | HEART RATE: 104 BPM | DIASTOLIC BLOOD PRESSURE: 50 MMHG | BODY MASS INDEX: 22.33 KG/M2

## 2021-08-20 DIAGNOSIS — F41.9 ANXIETY: Primary | ICD-10-CM

## 2021-08-20 DIAGNOSIS — Z56.6 WORK-RELATED STRESS: ICD-10-CM

## 2021-08-20 DIAGNOSIS — F40.10 SOCIAL ANXIETY DISORDER: ICD-10-CM

## 2021-08-20 DIAGNOSIS — F41.1 GENERALIZED ANXIETY DISORDER: Primary | ICD-10-CM

## 2021-08-20 PROCEDURE — 99214 OFFICE O/P EST MOD 30 MIN: CPT | Performed by: NURSE PRACTITIONER

## 2021-08-20 PROCEDURE — 90791 PSYCH DIAGNOSTIC EVALUATION: CPT | Performed by: SOCIAL WORKER

## 2021-08-20 RX ORDER — HYDROXYZINE HYDROCHLORIDE 10 MG/1
10-20 TABLET, FILM COATED ORAL EVERY 6 HOURS PRN
Qty: 40 TABLET | Refills: 0 | Status: SHIPPED | OUTPATIENT
Start: 2021-08-20 | End: 2021-09-17 | Stop reason: SDUPTHER

## 2021-08-20 RX ORDER — ESCITALOPRAM OXALATE 10 MG/1
10 TABLET ORAL DAILY
Qty: 30 TABLET | Refills: 3 | Status: SHIPPED | OUTPATIENT
Start: 2021-08-20 | End: 2021-11-19 | Stop reason: SDUPTHER

## 2021-08-20 SDOH — HEALTH STABILITY - MENTAL HEALTH: OTHER PHYSICAL AND MENTAL STRAIN RELATED TO WORK: Z56.6

## 2021-08-20 ASSESSMENT — ANXIETY QUESTIONNAIRES
7. FEELING AFRAID AS IF SOMETHING AWFUL MIGHT HAPPEN: 3
GAD7 TOTAL SCORE: 18
2. NOT BEING ABLE TO STOP OR CONTROL WORRYING: 2
6. BECOMING EASILY ANNOYED OR IRRITABLE: 2
5. BEING SO RESTLESS THAT IT IS HARD TO SIT STILL: 3
4. TROUBLE RELAXING: 3
1. FEELING NERVOUS, ANXIOUS, OR ON EDGE: 2
3. WORRYING TOO MUCH ABOUT DIFFERENT THINGS: 3

## 2021-08-20 ASSESSMENT — ENCOUNTER SYMPTOMS: COLOR CHANGE: 0

## 2021-08-20 NOTE — PATIENT INSTRUCTIONS
Patient Education        Learning About Anxiety Disorders  What are anxiety disorders? Anxiety disorders are a type of medical problem. They cause severe anxiety. When you feel anxious, you feel that something bad is about to happen. This feeling interferes with your life. These disorders include:  · Generalized anxiety disorder. You feel worried and stressed about many everyday events and activities. This goes on for several months and disrupts your life on most days. · Panic disorder. You have repeated panic attacks. A panic attack is a sudden, intense fear or anxiety. It may make you feel short of breath. Your heart may pound. · Social anxiety disorder. You feel very anxious about what you will say or do in front of people. For example, you may be scared to talk or eat in public. This problem affects your daily life. · Phobias. You are very scared of a specific object, situation, or activity. For example, you may fear spiders, high places, or small spaces. What are the symptoms? Generalized anxiety disorder  Symptoms may include:  · Feeling worried and stressed about many things almost every day. · Feeling tired or irritable. You may have a hard time concentrating. · Having headaches or muscle aches. · Having a hard time getting to sleep or staying asleep. Panic disorder  You may have repeated panic attacks when there is no reason for feeling afraid. You may change your daily activities because you worry that you will have another attack. Symptoms may include:  · Intense fear, terror, or anxiety. · Trouble breathing or very fast breathing. · Chest pain or tightness. · A heartbeat that races or is not regular. Social anxiety disorder  Symptoms may include:  · Fear about a social situation, such as eating in front of others or speaking in public. You may worry a lot. Or you may be afraid that something bad will happen. · Anxiety that can cause you to blush, sweat, and feel shaky.   · A heartbeat that is faster than normal.  · A hard time focusing. Phobias  Symptoms may include:  · More fear than most people of being around an object, being in a situation, or doing an activity. You might also be stressed about the chance of being around the thing you fear. · Worry about losing control, panicking, fainting, or having physical symptoms like a faster heartbeat when you are around the situation or object. How are these disorders treated? Anxiety disorders can be treated with medicines or counseling. A combination of both may be used. Medicines may include:  · Antidepressants. These may help your symptoms by keeping chemicals in your brain in balance. · Benzodiazepines. These may give you short-term relief of your symptoms. Some people use cognitive-behavioral therapy. A therapist helps you learn to change stressful or bad thoughts into helpful thoughts. Lead a healthy lifestyle  A healthy lifestyle may help you feel better. · Get at least 30 minutes of exercise on most days of the week. Walking is a good choice. · Eat a healthy diet. Include fruits, vegetables, lean proteins, and whole grains in your diet each day. · Try to go to bed at the same time every night. Try for 8 hours of sleep a night. · Find ways to manage stress. Try relaxation exercises. · Avoid alcohol and illegal drugs. Follow-up care is a key part of your treatment and safety. Be sure to make and go to all appointments, and call your doctor if you are having problems. It's also a good idea to know your test results and keep a list of the medicines you take. Where can you learn more? Go to https://caron.[employer]. org and sign in to your [employer] account. Enter D801 in the [facility] box to learn more about \"Learning About Anxiety Disorders. \"     If you do not have an account, please click on the \"Sign Up Now\" link.   Current as of: September 23, 2020               Content Version: 12.9  © 8785-9397 Healthwise, Incorporated. Care instructions adapted under license by [facility] [address] If you have questions about a medical condition or this instruction, always ask your healthcare professional. [place] [address] any warranty or liability for your use of this information.

## 2021-08-20 NOTE — PSYCHOTHERAPY
Behavioral Health Consultation  Marie Moss ELLIOT-S  8/20/2021  11:05 AM EDT      Time spent with Patient: 40 minutes  This is patient's first  [facility] appointment. Reason for Consult:    Chief Complaint   Patient presents with    Anxiety     Referring Provider: Marian Steele, NATALIE - CNP  [address],  [address]    Pt provided informed consent for the behavioral health program. Discussed with patient model of service to include the limits of confidentiality (i.e. abuse reporting, suicide intervention, etc.) and short-term intervention focused approach. Pt indicated understanding. Feedback given to PCP. S:  Pt and his mother identified the presenting problem as symptoms of high anxiety and indicated this as a primary need to address through behavioral health services. The history of the problem was explored with pt in establishing having experienced bouts of high anxiety as early as his childhood. Pt acknowledged that major changes within his place of work have significantly impacted his symptoms. a recent event that triggered \"influenced\" symptoms. Pt inventoried current stressors, strengths, resources, and coping skills. The current situation was processed with pt in examining thoughts, feelings, and impairment on daily functioning. Pt indicated symptoms of poor sleep, pacing, upset stomach, feeling anxious/edgy, racing thoughts, and ruminating on negative/worrisome thoughts. Pt was guided in exploring areas of behavioral change, development of effective coping strategies, and assessing the management of environmental factors influencing the problem. The OLIVER 7 was administered and pt scored 19, indicating Severe generalized anxiety disorder. Pt denied thoughts of suicide.  Pt was instructed to monitor if symptoms worsen or interfere with daily functioning, to consider following-up with either your primary care team or a behavioral health provider for possible counseling or medication management. If suicidal thoughts are experienced, call 911. An additional 24/7 resource is the [facility] [address] at 9-433-582-MBRG ([phone]). Pt will attend a follow up appointment next week. O:  MSE:    Appearance    Crying/cooperative  Appetite abnormal: poor  Sleep disturbance Yes  Fatigue No  Loss of pleasure Yes  Impulsive behavior No  Speech    normal rate  Mood    Anxious  euthymic   Affect    anxiety  Thought Content    intact  Thought Process    coherent  Associations    logical connections  Insight    Fair  Judgment    Intact  Orientation    oriented to person, place, time, and general circumstances  Memory    recent and remote memory intact  Attention/Concentration    intact  Morbid ideation No  Suicide Assessment    no suicidal ideation    History:    TOBACCO:   reports that he has never smoked. He has never used smokeless tobacco.  ETOH:   reports current alcohol use. Family History:   Family History   Problem Relation Age of Onset   Tubbs Mental Illness Mother     Mental Illness Father     Mental Illness Sister        A:    Diagnosis:    1. Generalized anxiety disorder      No past medical history on file. Plan: Pt will attend a follow up appointment next week to assess symptoms and evaluate effectiveness of coping skills. Pt interventions:  Provided handout on  anxiety, Trained in relaxation strategies and Discussed self-care (sleep, nutrition, rewarding activities, social support, exercise)    Pt Behavioral Change Plan:   1. Pt will read handouts regarding  anxiety, relaxation techniques, and self care. 2. Pt will practice self calming skills 2-3x's daily for 3-5 minutes. 3. Pt will promote effective self care habits daily/weekly-rest, relaxation, stress management, supportive relationships, increased physical outlets, engagement in enjoyable activities. 4. Pt will follow up with GUADALUPE Lugo  5.  Pt was instructed to monitor if symptoms worsen or interfere with daily functioning, to consider following-up with either your primary care team or a behavioral health provider for possible counseling or medication management. If suicidal thoughts are experienced, call 911. An additional 24/7 resource is the [address] at [phone]-YAUI ([phone]).

## 2021-08-20 NOTE — PROGRESS NOTES
[address] [facility] [address]  Dept: [phone]  Dept Fax: [phone]: [phone]     Visit Date:  8/20/2021      Patient:  Kim Baker  YOB: 1985    HPI:     Chief Complaint   Patient presents with    Stress     Wants to talk about stress and anxiety and possible dosage change in medication       HPI    Pt presents to the office today with his mother for ongoing issues and increasing anxiety and stress with work. Pt has been treated in the past with Zoloft 150 mg, but stopped taking it 2-3 months ago. Tried to restart it a week ago and had some stomach pain. Not taking the buspar. Pt doesn't really know if any of these helped, but admits that he was not taking them correctly either. He feels like he needs to be off work to get back to [employer]  He has FMLA paperwork with him. Pt denies any thoughts of hurting himself or others. Sleep-less having anxiety at night. Interest- OK  Guilt- OK  Energy- less  Concentration- less  Appetite- OK  Psychomotor Retardation- NO  Suicidal/ Homicidal Ideations- NO  Anxiety-increased    Employer? Lost work days?- [employer]. Pt has missed a few after coming back from vacation for 2 weeks. Medications    Current Outpatient Medications:     escitalopram (LEXAPRO) 10 MG tablet, Take 1 tablet by mouth daily, Disp: 30 tablet, Rfl: 3    hydrOXYzine (ATARAX) 10 MG tablet, Take 1-2 tablets by mouth every 6 hours as needed for Anxiety, Disp: 40 tablet, Rfl: 0    aspirin 325 MG tablet, Take 325 mg by mouth as needed for Pain, Disp: , Rfl:     The patient has No Known Allergies. Past Medical History  Daniel Polk  has no past medical history on file. Subjective:      Review of Systems   Constitutional: Positive for fatigue. Negative for chills and fever. Skin: Negative for color change and rash. Neurological: Negative for dizziness, weakness and headaches. Psychiatric/Behavioral: Positive for confusion, decreased concentration, dysphoric mood and sleep disturbance. Negative for self-injury and suicidal ideas. The patient is nervous/anxious. Objective:     BP (!) 114/50   Pulse 104   Ht 5' 10\" (1.778 m)   Wt 156 lb (70.8 kg)   BMI 22.38 kg/m²     Physical Exam  Vitals reviewed. Constitutional:       General: He is not in acute distress. Appearance: He is well-developed. HENT:      Head: Normocephalic and atraumatic. Eyes:      General:         Right eye: No discharge. Left eye: No discharge. Conjunctiva/sclera: Conjunctivae normal.   Cardiovascular:      Rate and Rhythm: Normal rate and regular rhythm. Heart sounds: Normal heart sounds. Pulmonary:      Effort: Pulmonary effort is normal. No respiratory distress. Breath sounds: Normal breath sounds. Skin:     General: Skin is warm and dry. Neurological:      General: No focal deficit present. Mental Status: He is alert and oriented to person, place, and time. Coordination: Coordination normal.   Psychiatric:         Attention and Perception: Attention normal.         Mood and Affect: Mood is depressed. Speech: Speech normal.         Behavior: Behavior normal. Behavior is cooperative. Thought Content: Thought content normal. Thought content does not include homicidal or suicidal ideation. Thought content does not include homicidal or suicidal plan. Cognition and Memory: Cognition normal.         Judgment: Judgment normal.         Assessment/Plan:      Felix Cartagena was seen today for stress. Diagnoses and all orders for this visit:    Anxiety  -     escitalopram (LEXAPRO) 10 MG tablet; Take 1 tablet by mouth daily  -     Karlie Valderrama, [address]  -     hydrOXYzine (ATARAX) 10 MG tablet;  Take 1-2 tablets by mouth every 6 hours as needed for Anxiety    Social anxiety disorder  -     escitalopram (Dax Meager) 10 MG tablet; Take 1 tablet by mouth daily  -     [address], [place] [address], [address], [address], [place], [address], [address] discussion with patient and his mother about medications, counseling and being off work. Stressed to the pt that the only ways the medications work is if they are taken properly. Pt agreeable to trying a new SSRI. Discussed side effects and pt aware of them. - Appointment made for counseling with Moreno Pozo today at 11 AM.    - OK for FMLA off Aug 18-Sept 15th  - Follow up in 2 weeks. - Greater than 50% of this 30 min visit was spent on counseling and coordination of care. Return in about 2 weeks (around 9/3/2021), or if symptoms worsen or fail to improve, for Routine follow up. Patient given educational materials - see patient instructions. Discussed use, benefit, and side effects of prescribed medications. All patient questions answered. Pt voiced understanding.         Electronically signed by NATALIE Sheikh CNP on 8/20/2021 at 11:35 AM

## 2021-08-20 NOTE — PATIENT INSTRUCTIONS
1. The Stress Response and How It Can Affect You   The stress response, or fight or flight response is the emergency reaction system of the body. It is there to keep you safe in emergencies. The stress response includes physical and thought responses to your perception of various situations. When the stress response is turned on, your body may release substances like adrenaline and cortisol. Your organs are programmed to respond in certain ways to situations that are viewed as challenging or threatening. The stress response can work against you. You can turn it on when you dont really need it and, as a result, perceive something as an emergency when its really not. It can turn on when you are just thinking about past or future events. Harmless, chronic conditions can be intensified by the stress response activating too often, with too much intensity, or for too long. Stress responses can be different for different individuals. Below is a list of some common stress related responses people have. (Darden the responses you have had in the last 2 weeks.)                                                                                                 Physical Responses   Muscle aches   Insomnia   ? Heart rate   Headache   Weight gain   Nausea   Constipation   Dry mouth   Muscle twitching  Weight loss   Low energy   Weakness   Tight chest   Diarrhea   Dizziness   Trembling   Stomach cramps  Chills    Hot flashes   Sweating   Pounding heart  Choking feeling  Chest pain   Leg cramps   Numb hands/feet Dry throat   Appetite change  Face flushing   ? Blood pressure  Light-headedness  Feeling faint        Trouble swallowing   Rash ?    Urination   Neck pain             Tingling hands/feet                                                                                             Emotional and Thought Responses   Restlessness   Agitation   Insecurity             Worthlessness   Anxiety   Stress    Depression Hopelessness   Guilt    Defensiveness  Anger            Racing thoughts   Nightmares   Intense thinking  Sensitivity            Expecting the worst   Numbness   Lack of motivation  Mood swings             Forgetfulness   ? Concentration  Rigidity              Preoccupation  Intolerance                                                Behavioral Responses   Avoidance   Withdrawal   Neglect   ? Alcohol use    Smoking   ? Eating   Arguing        Poor appearance   ? Spending   Poor hygiene   ? Eating   Seeking reassurance   Nail biting   Skin picking   ? Talking         ? Body checking   Sexual problems  Foot tapping   Fidgeting Rapid walking    ? Exercise   Teeth clenching           Multitasking   Aggressive speaking       ? Fun activities  ? Sleeping      ? Relaxing activities     Seeking information     The parasympathetic nervous system in your body is designed to turn on your bodys relaxation response. Your behaviors and thinking can keep your bodys natural relaxation response from operating at its best.     Getting your body to relax on a daily basis for at least brief periods can help decrease unpleasant stress responses. Learning to relax your body, through specific breathing and relaxation exercises as well as by minimizing stressful thinking, can help your bodys natural relaxation system be more effective. 2.   Deep Breathing Exercises      Exercise 1:  The Stimulating Breath (also called the Conor Breath)   Its aim is to raise energy level and increase alertness. - Inhale and exhale rapidly through your nose, keeping your mouth closed but relaxed. Your breaths in and out should be equal in duration, but as short as possible. This is a noisy breathing exercise. - Try for three in-and-out breath cycles per second. This produces a quick movement of the diaphragm, suggesting a conor. Breathe normally after each cycle. - Do not do for more than 15 seconds on your first try.  Each time you practice the Stimulating Breath, you can increase your time by five seconds or so, until you reach a full minute. If done properly, you may feel invigorated, comparable to the heightened awareness you feel after a good workout. You should feel the effort at the back of the neck, the diaphragm, the chest and the abdomen. Try this breathing exercise the next time you need an energy boost and feel yourself reaching for a cup of coffee. Exercise 2:  The 4-7-8 (or Relaxing Breath) Exercise  This exercise is utterly simple, takes almost no time, requires no equipment and can be done anywhere. Although you can do the exercise in any position, sit with your back straight while learning the exercise. Place the tip of your tongue against the ridge of tissue just behind your upper front teeth, and keep it there through the entire exercise. You will be exhaling through your mouth around your tongue; try pursing your lips slightly if this seems awkward.  Exhale completely through your mouth, making a whoosh sound.  Close your mouth and inhale quietly through your nose to a mental count of four.  Hold your breath for a count of seven.  Exhale completely through your mouth, making a whoosh sound to a count of eight.  This is one breath. Now inhale again and repeat the cycle three more times for a total of four breaths. Note that you always inhale quietly through your nose and exhale audibly through your mouth. The tip of your tongue stays in position the whole time. Exhalation takes twice as long as inhalation. The absolute time you spend on each phase is not important; the ratio of 4:7:8 is important. If you have trouble holding your breath, speed the exercise up but keep to the ratio of 4:7:8 for the three phases. With practice you can slow it all down and get used to inhaling and exhaling more and more deeply. This exercise is a natural tranquilizer for the nervous system.  Unlike tranquilizing drugs, which are often 1. ________________________                        2.  ______________________                        3.  _______________________     1. __________________________        2. __________________________        3. __________________________        1.  _________________________        2. __________________________        3. __________________________        1. __________________________        2. __________________________        3.  __________________________           Constructive Worry Instructions  When we have challenges, we tend to use our problem-solving skills to make our lives better and to relieve ourselves of anxiety. It is not surprising that some of us may use our problem-solving skills at the wrong time and place, namely bedtime. We may think about a problem, trying to solve it, but unfortunately this will oftentimes keep us awake. Constructive worry is a method for managing the tendency to worry during that quiet time when sleep is supposed to be taking over. Do this exercise early in the evening (at least 2 hours before bed). It should take only about 15 minutes to complete. Here is how it is done:  1. Write down the problem(s) facing you that has the greatest chance of keeping you awake a bedtime, and list them in the Concerns column of the [address]. 2. Then, think of the next step that might help fix it. Write it down in the Solutions column. This need not be the final solution to the problem, since most problems have to be solved by taking steps anyhow, and you will be doing this again tomorrow night and the night after until you finally get to the best solution.  If you know how to fix the problem completely, then write that down.  If you decide that this is not really a big problem, and you will just deal with it when the time comes, then write that down.    If you decide that you simply do not know what to do about it, and need to ask someone to help you, write that down.  If you decide that it is a problem, but there seems to be no good solution at all, and that you will just have to live with it, write that down, with a note to yourself that maybe sometime soon you or someone you speak with will give you a clue that will lead you to a solution. 3. Repeat this for any other concerns you have. 4. Fold the Constructive Worry Worksheet in half and place it on the nightstand next to your bed and forget about it until bedtime. 5. At bedtime, if you begin to worry actually tell yourself that you have dealt with your problems already in the best way you know how, and when you were at your problem-solving best.  Remind yourself that you will be working on them again tomorrow evening and that nothing you can do while you are so tired can help you any more than what you have already done; more effort will only make the matters worst.    5. Pt will follow up with GUADALUPE Martinez    6. Pt was advised of indications of depressive symptoms and instructed to monitor if symptoms worsen or interfere with daily functioning, to consider following-up with either your primary care team or a behavioral health provider for possible counseling or medication management. If suicidal thoughts are experienced, call 911. An additional 24/7 resource is the [facility] at [phone] ([phone]).

## 2021-08-24 ENCOUNTER — TELEPHONE (OUTPATIENT)
Dept: FAMILY MEDICINE CLINIC | Age: 36
End: 2021-08-24

## 2021-08-24 NOTE — TELEPHONE ENCOUNTER
lmtcb with pt's mom regarding FMLA paperwork. To discuss time off for appts and follow-ups how many episodes?

## 2021-08-24 NOTE — TELEPHONE ENCOUNTER
Spoke with mom and discussed additional allotment for visits with Vania Zelaya. She will discuss further with patient and let our office know. States that patient will now also need a short term disability packet completed and would like our office to wait on sending the FMLA packet in until this is also completed. Will let us know about the additional time when the short term packet is dropped off.   (FMLA packet is in the mail slot at this time)

## 2021-08-27 ENCOUNTER — OFFICE VISIT (OUTPATIENT)
Dept: BEHAVIORAL/MENTAL HEALTH CLINIC | Age: 36
End: 2021-08-27
Payer: COMMERCIAL

## 2021-08-27 DIAGNOSIS — F41.1 GENERALIZED ANXIETY DISORDER: Primary | ICD-10-CM

## 2021-08-27 PROCEDURE — 90834 PSYTX W PT 45 MINUTES: CPT | Performed by: SOCIAL WORKER

## 2021-08-27 NOTE — PSYCHOTHERAPY
Behavioral Health Consultation  ELLIOT Sandoval-S  8/27/2021  11:05 AM EDT      Time spent with Patient: 38 minutes  This is patient's second [facility] appointment. Reason for Consult:    Chief Complaint   Patient presents with    Depression     Referring Provider: No referring provider defined for this encounter. Pt provided informed consent for the behavioral health program. Discussed with patient model of service to include the limits of confidentiality (i.e. abuse reporting, suicide intervention, etc.) and short-term intervention focused approach. Pt indicated understanding. Feedback given to PCP. S:  Pt and his mother assessed a slight decrease in symptoms of high anxiety, but determined that symptoms continue to occur at a high rate during the week. Pt acknowledged that the last two days having been intense in symptoms of being on edge, ruminating, and racing thoughts. Pt verbalized that his tendency of over thinking and analyzing has created further challenges for him. He expressed some doubt in his ability to disengage from thoughts of \"why\" and \"what if\", but agreed to do his best in accepting that focusing on his current needs to relax and distract might be more helpful and productive. He was guided in review behavioral changes, use of effective coping strategies, and  management of environmental factors influencing problems and symptoms. Pt denied thoughts of suicide. Pt was instructed to monitor if symptoms worsen or interfere with daily functioning, to consider following-up with either your primary care team or a behavioral health provider for possible counseling or medication management. If suicidal thoughts are experienced, call 911. An additional 24/7 resource is the [facility] at [phone]). Pt will attend a follow up appointment next week.     O:  MSE:    Appearance    cooperative  Appetite abnormal: poor  Sleep disturbance Yes  Fatigue No  Loss

## 2021-08-27 NOTE — PATIENT INSTRUCTIONS
Pt will practice self calming skills 2-3x's daily for 3-5 minutes. Pt will promote effective self care habits daily/weekly-rest, relaxation, stress management, supportive relationships, increased physical outlets, engagement in enjoyable activities. Pt will follow up with GUADALUPE Stallings  4. Pt was advised of indications of depressive symptoms and instructed to monitor if symptoms worsen or interfere with daily functioning, to consider following-up with either your primary care team or a behavioral health provider for possible counseling or medication management. If suicidal thoughts are experienced, call 911. An additional 24/7 resource is the [facility] at [phone] ([phone]).

## 2021-09-03 ENCOUNTER — OFFICE VISIT (OUTPATIENT)
Dept: BEHAVIORAL/MENTAL HEALTH CLINIC | Age: 36
End: 2021-09-03
Payer: COMMERCIAL

## 2021-09-03 ENCOUNTER — OFFICE VISIT (OUTPATIENT)
Dept: FAMILY MEDICINE CLINIC | Age: 36
End: 2021-09-03
Payer: COMMERCIAL

## 2021-09-03 VITALS
TEMPERATURE: 98 F | RESPIRATION RATE: 16 BRPM | DIASTOLIC BLOOD PRESSURE: 70 MMHG | WEIGHT: 158.6 LBS | BODY MASS INDEX: 22.76 KG/M2 | SYSTOLIC BLOOD PRESSURE: 120 MMHG | HEART RATE: 76 BPM

## 2021-09-03 DIAGNOSIS — F40.10 SOCIAL ANXIETY DISORDER: Primary | ICD-10-CM

## 2021-09-03 DIAGNOSIS — F32.A DEPRESSION, UNSPECIFIED DEPRESSION TYPE: ICD-10-CM

## 2021-09-03 DIAGNOSIS — F41.1 GENERALIZED ANXIETY DISORDER: Primary | ICD-10-CM

## 2021-09-03 PROCEDURE — 90832 PSYTX W PT 30 MINUTES: CPT | Performed by: SOCIAL WORKER

## 2021-09-03 PROCEDURE — 99213 OFFICE O/P EST LOW 20 MIN: CPT | Performed by: NURSE PRACTITIONER

## 2021-09-03 SDOH — ECONOMIC STABILITY: FOOD INSECURITY: WITHIN THE PAST 12 MONTHS, THE FOOD YOU BOUGHT JUST DIDN'T LAST AND YOU DIDN'T HAVE MONEY TO GET MORE.: PATIENT DECLINED

## 2021-09-03 SDOH — ECONOMIC STABILITY: FOOD INSECURITY: WITHIN THE PAST 12 MONTHS, YOU WORRIED THAT YOUR FOOD WOULD RUN OUT BEFORE YOU GOT MONEY TO BUY MORE.: PATIENT DECLINED

## 2021-09-03 ASSESSMENT — ENCOUNTER SYMPTOMS
COLOR CHANGE: 0
BACK PAIN: 0

## 2021-09-03 ASSESSMENT — SOCIAL DETERMINANTS OF HEALTH (SDOH): HOW HARD IS IT FOR YOU TO PAY FOR THE VERY BASICS LIKE FOOD, HOUSING, MEDICAL CARE, AND HEATING?: PATIENT DECLINED

## 2021-09-03 NOTE — PATIENT INSTRUCTIONS
Pt will practice self calming skills 2-3x's daily for 3-5 minutes. Pt will promote effective self care habits daily/weekly-rest, relaxation, stress management, supportive relationships, increased physical outlets, engagement in enjoyable activities. Pt will follow up with GUADALUPE Chatterjee  4. Pt was advised of indications of depressive symptoms and instructed to monitor if symptoms worsen or interfere with daily functioning, to consider following-up with either your primary care team or a behavioral health provider for possible counseling or medication management. If suicidal thoughts are experienced, call 911. An additional 24/7 resource is the [employer] [facility] at [phone] ([phone]). 5. Pt will build daily/weekly structure and routine.   6. Pt will constructive a pros and cons list.

## 2021-09-03 NOTE — PSYCHOTHERAPY
coherent  Associations    logical connections  Insight    Fair  Judgment    Intact  Orientation    oriented to person, place, time, and general circumstances  Memory    recent and remote memory intact  Attention/Concentration    intact  Morbid ideation No  Suicide Assessment    no suicidal ideation    History:    TOBACCO:   reports that he has never smoked. He has never used smokeless tobacco.  ETOH:   reports current alcohol use. Family History:   Family History   Problem Relation Age of Onset   Martinez Jack Mental Illness Mother     Mental Illness Father     Mental Illness Sister        A:    Diagnosis:    1. Generalized anxiety disorder      No past medical history on file. Plan: Pt will attend a follow up appointment next week to assess symptoms and evaluate effectiveness of coping skills. Pt interventions:  Provided handout on  anxiety, Trained in relaxation strategies and Discussed self-care (sleep, nutrition, rewarding activities, social support, exercise)    Pt Behavioral Change Plan:   1. Pt will disengage from anxiety provoking thoughts and distract from ruminating. 2. Pt will practice self calming skills 2-3x's daily for 3-5 minutes. 3. Pt will promote effective self care habits daily/weekly-rest, relaxation, stress management, supportive relationships, increased physical outlets, engagement in enjoyable activities. 4. Pt will follow up with GUADALUPE Freeman  5. Pt was instructed to monitor if symptoms worsen or interfere with daily functioning, to consider following-up with either your primary care team or a behavioral health provider for possible counseling or medication management. If suicidal thoughts are experienced, call 911. An additional 24/7 resource is the [facility] at [phone]).

## 2021-09-03 NOTE — PROGRESS NOTES
[address] [facility] [address]  Dept: [phone]  Dept Fax: [phone]  Loc: [phone]     9/3/2021     Kristen Nunez (:  1985) is a 30 y.o. male, here for evaluation of the following medical concerns:    Chief Complaint   Patient presents with    2 Week Follow-Up       HPI    Pt is here for depression. Pt currently taking lexapro 10 and atarax PRN. Pt following up with counseling also. Going OK. Pt still feels like he can not go back to work, he has anxiety just thinking about it. He thinks the lexapro is helping a little, but not all there yet. Side effects noted: none    Sleep-OK  Interest- OK  Guilt- OK  Energy- OK  Concentration- OK  Appetite- OK  Psychomotor Retardation- NO  Suicidal/ Homicidal Ideations- NO  Anxiety-OK    Employer? Lost work days? -       Review of Systems   Constitutional: Negative for chills, fatigue and fever. Musculoskeletal: Negative for back pain, gait problem and neck pain. Skin: Negative for color change and rash. Psychiatric/Behavioral: Negative for confusion, decreased concentration, dysphoric mood, self-injury, sleep disturbance and suicidal ideas. The patient is nervous/anxious. Prior to Visit Medications    Medication Sig Taking?  Authorizing Provider   Multiple Vitamin (MULTIVITAMIN ADULT PO) Take by mouth Yes Historical Provider, MD   Omega-3 Fatty Acids (FISH OIL PO) Take by mouth Yes Historical Provider, MD   B Complex Vitamins (VITAMIN B COMPLEX PO) Take by mouth Yes Historical Provider, MD   escitalopram (LEXAPRO) 10 MG tablet Take 1 tablet by mouth daily Yes NATALIE Hauser CNP   hydrOXYzine (ATARAX) 10 MG tablet Take 1-2 tablets by mouth every 6 hours as needed for Anxiety Yes NATALIE Hauser CNP   aspirin 325 MG tablet Take 325 mg by mouth as needed for Pain Yes Historical Provider, MD        Social History     Tobacco Use    Smoking status: Never Smoker    Smokeless tobacco: Never Used   Substance Use Topics    Alcohol use: Yes     Comment: daily        Vitals:    09/03/21 0805   BP: 120/70   Site: Right Upper Arm   Position: Sitting   Pulse: 76   Resp: 16   Temp: 98 °F (36.7 °C)   TempSrc: Oral   Weight: 158 lb 9.6 oz (71.9 kg)     Estimated body mass index is 22.76 kg/m² as calculated from the following:    Height as of 8/20/21: 5' 10\" (1.778 m). Weight as of this encounter: 158 lb 9.6 oz (71.9 kg). Physical Exam  Vitals reviewed. Constitutional:       General: He is not in acute distress. Appearance: He is well-developed. HENT:      Head: Normocephalic and atraumatic. Eyes:      Conjunctiva/sclera: Conjunctivae normal.   Pulmonary:      Effort: Pulmonary effort is normal. No respiratory distress. Skin:     General: Skin is warm and dry. Neurological:      General: No focal deficit present. Mental Status: He is alert and oriented to person, place, and time. Coordination: Coordination normal.   Psychiatric:         Mood and Affect: Mood normal.         Behavior: Behavior normal.         Thought Content: Thought content normal.         Judgment: Judgment normal.         ASSESSMENT/PLAN:  1. Social anxiety disorder    2. Depression, unspecified depression type    - Discussed FMLA with patient and his mother. Will write off through 9/22/21, but pt will have to return after that or follow up with [facility] for further evaluation and they would need to fill out further paperwork. We did discuss possible job change if its causing this much anxiety and stress. Pt verbalized understanding.   - Continue on lexapro 10 daily and atarax as needed for anxiety  - Follow up in 2 weeks, and with Thom Posadas as planned.   - Call office with any questions or concerns, or if symptoms are getting worse or changing      Return in about 2 weeks (around 9/17/2021), or if symptoms worsen or fail to improve, for Routine follow up, Medication check. Patient given educational materials - see patient instructions. Discussed use, benefit, and side effects of prescribed medications. All patient questions answered. Pt voiced understanding. Reviewed health maintenance. An electronic signature was used to authenticate this note.     --NATALIE Mccurdy - CNP on 9/3/2021 at 9:00 AM

## 2021-09-03 NOTE — PATIENT INSTRUCTIONS
Patient Education        Recovering From Depression: Care Instructions  Your Care Instructions     Taking good care of yourself is important as you recover from depression. In time, your symptoms will fade as your treatment takes hold. Do not give up. Instead, focus your energy on getting better. Your mood will improve. It just takes some time. Focus on things that can help you feel better, such as being with friends and family, eating well, and getting enough rest. But take things slowly. Do not do too much too soon. You will begin to feel better gradually. Follow-up care is a key part of your treatment and safety. Be sure to make and go to all appointments, and call your doctor if you are having problems. It's also a good idea to know your test results and keep a list of the medicines you take. How can you care for yourself at home? Be realistic  · If you have a large task to do, break it up into smaller steps you can handle, and just do what you can. · You may want to put off important decisions until your depression has lifted. If you have plans that will have a major impact on your life, such as marriage, divorce, or a job change, try to wait a bit. Talk it over with friends and loved ones who can help you look at the overall picture first.  · Reaching out to people for help is important. Do not isolate yourself. Let your family and friends help you. Find someone you can trust and confide in, and talk to that person. · Be patient, and be kind to yourself. Remember that depression is not your fault and is not something you can overcome with willpower alone. Treatment is important for depression, just like for any other illness. Feeling better takes time, and your mood will improve little by little. Stay active  · Stay busy and get outside. Take a walk, or try some other light exercise. · Talk with your doctor about an exercise program. Exercise can help with mild depression.   · Go to a movie or concert. Take part in a Quaker activity or other social gathering. Go to a [employer] game. · Ask a friend to have dinner with you. Take care of yourself  · Eat a balanced diet with plenty of fresh fruits and vegetables, whole grains, and lean protein. If you have lost your appetite, eat small snacks rather than large meals. · Avoid using illegal drugs or marijuana and drinking alcohol. Do not take medicines that have not been prescribed for you. They may interfere with medicines you may be taking for depression, or they may make your depression worse. · Take your medicines exactly as they are prescribed. You may start to feel better within 1 to 3 weeks of taking antidepressant medicine. But it can take as many as 6 to 8 weeks to see more improvement. If you have questions or concerns about your medicines, or if you do not notice any improvement by 3 weeks, talk to your doctor. · Continue to take your medicine after your symptoms improve. Taking your medicine for at least 6 months after you feel better can help keep you from getting depressed again. If this isn't the first time you have been depressed, your doctor may recommend you to take medicine even longer. · If you have any side effects from your medicine, tell your doctor. Many side effects are mild and will go away on their own after you have been taking the medicine for a few weeks. Some may last longer. Talk to your doctor if side effects are bothering you too much. You might be able to try a different medicine. · Continue counseling. It may help prevent depression from returning, especially if you've had multiple episodes of depression. Talk with your counselor if you are having a hard time attending your sessions or you think the sessions aren't working. Don't just stop going. · Get enough sleep. Talk to your doctor if you are having problems sleeping. · Avoid sleeping pills unless they are prescribed by the doctor treating your depression.  Sleeping instruction, always ask your healthcare professional. [place] [phone] any warranty or liability for your use of this information.

## 2021-09-10 ENCOUNTER — OFFICE VISIT (OUTPATIENT)
Dept: BEHAVIORAL/MENTAL HEALTH CLINIC | Age: 36
End: 2021-09-10
Payer: COMMERCIAL

## 2021-09-10 DIAGNOSIS — F32.A DEPRESSION, UNSPECIFIED DEPRESSION TYPE: ICD-10-CM

## 2021-09-10 DIAGNOSIS — F41.1 GENERALIZED ANXIETY DISORDER: Primary | ICD-10-CM

## 2021-09-10 PROCEDURE — 90834 PSYTX W PT 45 MINUTES: CPT | Performed by: SOCIAL WORKER

## 2021-09-10 NOTE — PATIENT INSTRUCTIONS
Pt will practice self calming skills 2-3x's daily for 3-5 minutes. Pt will promote effective self care habits daily/weekly-rest, relaxation, stress management, supportive relationships, increased physical outlets, engagement in enjoyable activities. Pt will follow up with GUADALUPE Scott  4. Pt was advised of indications of depressive symptoms and instructed to monitor if symptoms worsen or interfere with daily functioning, to consider following-up with either your primary care team or a behavioral health provider for possible counseling or medication management. If suicidal thoughts are experienced, call 911. An additional 24/7 resource is the [employer] at [phone] ([phone]). 5. Pt will build daily/weekly structure and routine.   6. Pt will constructive a pros and cons list.

## 2021-09-10 NOTE — PSYCHOTHERAPY
Behavioral Health Consultation  EdELLIOT KumariLoisS  9/10/2021  9:30 AM EDT      Time spent with Patient: 38 minutes  This is patient's third [facility] appointment. Reason for Consult:    Chief Complaint   Patient presents with    Anxiety    Depression     Referring Provider: No referring provider defined for this encounter. Pt provided informed consent for the behavioral health program. Discussed with patient model of service to include the limits of confidentiality (i.e. abuse reporting, suicide intervention, etc.) and short-term intervention focused approach. Pt indicated understanding. Feedback given to PCP. S:  Pt and his mother assessed a continued slight decrease in symptoms of high anxiety. Pt determined that having a routine and projects have been  strong supports in symptom management. He acknowledged that thoughts about his plan to return to work continues to be significant trigger. Pt was guided in examining his efforts of being more logical and balanced in his process of staying at his job and/or making a job change. He processed and conveyed how his anxiety presents possible outcomes in the worse case scenario, but was open to explore evidence of his safety net and skills sets challenging this false perception. He was guided in review behavioral changes, use of effective coping strategies, and  management of environmental factors influencing problems and symptoms. Pt denied thoughts of suicide. Pt was instructed to monitor if symptoms worsen or interfere with daily functioning, to consider following-up with either your primary care team or a behavioral health provider for possible counseling or medication management. If suicidal thoughts are experienced, call 911. An additional 24/7 resource is the Jennifer 10 at [phone] ([phone]). No further appointments will be scheduled.  Pt will contact the office to schedule an appointment if needed. O:  MSE:    Appearance    cooperative  Appetite abnormal: poor  Sleep disturbance Yes  Fatigue No  Loss of pleasure Yes  Impulsive behavior No  Speech    normal rate  Mood    Anxious  euthymic   Affect    anxiety  Thought Content    intact  Thought Process    coherent  Associations    logical connections  Insight    Fair  Judgment    Intact  Orientation    oriented to person, place, time, and general circumstances  Memory    recent and remote memory intact  Attention/Concentration    intact  Morbid ideation No  Suicide Assessment    no suicidal ideation    History:    TOBACCO:   reports that he has never smoked. He has never used smokeless tobacco.  ETOH:   reports current alcohol use. Family History:   Family History   Problem Relation Age of Onset   [facility] Mental Illness Mother     Mental Illness Father     Mental Illness Sister        A:    Diagnosis:    1. Generalized anxiety disorder    2. Depression, unspecified depression type      No past medical history on file. Plan: No further appointments will be scheduled. Pt will contact the office to schedule an appointment if needed. Pt interventions:  Provided handout on  anxiety, Trained in relaxation strategies and Discussed self-care (sleep, nutrition, rewarding activities, social support, exercise)    Pt Behavioral Change Plan:   1. Pt will disengage from anxiety provoking thoughts and distract from ruminating. 2. Pt will practice self calming skills 2-3x's daily for 3-5 minutes. 3. Pt will promote effective self care habits daily/weekly-rest, relaxation, stress management, supportive relationships, increased physical outlets, engagement in enjoyable activities. No further appointments will be scheduled. Pt will contact the office to schedule an appointment if needed.   5. Pt was instructed to monitor if symptoms worsen or interfere with daily functioning, to consider following-up with either your primary care team or a behavioral health provider for possible counseling or medication management. If suicidal thoughts are experienced, call 911. An additional 24/7 resource is the Jennifer 10 at [phone] ([phone]).

## 2021-09-17 ENCOUNTER — OFFICE VISIT (OUTPATIENT)
Dept: FAMILY MEDICINE CLINIC | Age: 36
End: 2021-09-17
Payer: COMMERCIAL

## 2021-09-17 VITALS
HEART RATE: 80 BPM | WEIGHT: 157 LBS | SYSTOLIC BLOOD PRESSURE: 138 MMHG | RESPIRATION RATE: 16 BRPM | BODY MASS INDEX: 22.53 KG/M2 | DIASTOLIC BLOOD PRESSURE: 82 MMHG

## 2021-09-17 DIAGNOSIS — F41.9 ANXIETY: ICD-10-CM

## 2021-09-17 DIAGNOSIS — F40.10 SOCIAL ANXIETY DISORDER: Primary | ICD-10-CM

## 2021-09-17 DIAGNOSIS — F33.1 MODERATE EPISODE OF RECURRENT MAJOR DEPRESSIVE DISORDER (HCC): ICD-10-CM

## 2021-09-17 PROCEDURE — 99213 OFFICE O/P EST LOW 20 MIN: CPT | Performed by: NURSE PRACTITIONER

## 2021-09-17 RX ORDER — HYDROXYZINE HYDROCHLORIDE 10 MG/1
10-20 TABLET, FILM COATED ORAL EVERY 6 HOURS PRN
Qty: 40 TABLET | Refills: 3 | Status: SHIPPED | OUTPATIENT
Start: 2021-09-17

## 2021-09-17 ASSESSMENT — ENCOUNTER SYMPTOMS
WHEEZING: 0
SHORTNESS OF BREATH: 0
COUGH: 0
BACK PAIN: 0
FACIAL SWELLING: 0
COLOR CHANGE: 0

## 2021-09-17 NOTE — PROGRESS NOTES
[address] [facility] [address]  Dept: [phone]  Dept Fax: [phone]  Loc: [phone]     2021     Tierra Castillo (:  1985) is a 30 y.o. male, here for evaluation of the following medical concerns:    Chief Complaint   Patient presents with    Follow-up     No concerns     Letter for School/Work     Need to send it to ismael LYN    Pt is here for depression/anxiety follow up with his mother. Pt has been off 5+ weeks and started on medications for anxiety related to work. He does not know if he feels less anxious or if the medications are helping. He is following up with counseling. .    Pt currently taking Lexapro 10 mg daily. Needs tylenol PM to sleep. Strong dreams. Atarax as needed, took it 3-4 times this week. Side effects noted: possible dreams. Pt needs a slip back to work at 2021. Sleep-OK, has some dreams but is sleeping OK. Interest- OK  Guilt- OK  Energy- OK  Concentration- OK  Appetite- OK  Psychomotor Retardation- NO  Suicidal/ Homicidal Ideations- NO  Anxiety-    Employer? Lost work days?- off work on [address] [place] [address]   Constitutional: Negative for chills, fatigue and fever. HENT: Negative for facial swelling. Respiratory: Negative for cough, shortness of breath and wheezing. Cardiovascular: Negative for chest pain and palpitations. Musculoskeletal: Negative for back pain, gait problem and neck pain. Skin: Negative for color change and rash. Neurological: Negative for dizziness, weakness and headaches. Psychiatric/Behavioral: Positive for decreased concentration and sleep disturbance. Negative for agitation and self-injury. The patient is nervous/anxious. Prior to Visit Medications    Medication Sig Taking?  Authorizing Provider   hydrOXYzine (ATARAX) 10 MG tablet Take 1-2 tablets by mouth every 6 hours as needed for Anxiety Yes Afua Loyola NATALIE Ellington - CNP   Multiple Vitamin (MULTIVITAMIN ADULT PO) Take by mouth Yes Historical Provider, MD   Omega-3 Fatty Acids (FISH OIL PO) Take by mouth Yes Historical Provider, MD   B Complex Vitamins (VITAMIN B COMPLEX PO) Take by mouth Yes Historical Provider, MD   escitalopram (LEXAPRO) 10 MG tablet Take 1 tablet by mouth daily Yes NATALIE Bassett - CNP   aspirin 325 MG tablet Take 325 mg by mouth as needed for Pain Yes Historical Provider, MD        Social History     Tobacco Use    Smoking status: Never Smoker    Smokeless tobacco: Never Used   Substance Use Topics    Alcohol use: Yes     Comment: daily        Vitals:    09/17/21 0855   BP: 138/82   Pulse: 80   Resp: 16   Weight: 157 lb (71.2 kg)     Estimated body mass index is 22.53 kg/m² as calculated from the following:    Height as of 8/20/21: 5' 10\" (1.778 m). Weight as of this encounter: 157 lb (71.2 kg). Physical Exam  Vitals reviewed. Constitutional:       General: He is not in acute distress. Appearance: He is well-developed. HENT:      Head: Normocephalic and atraumatic. Eyes:      General:         Right eye: No discharge. Left eye: No discharge. Conjunctiva/sclera: Conjunctivae normal.   Pulmonary:      Effort: Pulmonary effort is normal. No respiratory distress. Skin:     General: Skin is warm and dry. Neurological:      General: No focal deficit present. Mental Status: He is alert and oriented to person, place, and time. Coordination: Coordination normal.   Psychiatric:         Attention and Perception: Attention normal.         Mood and Affect: Affect is flat. Behavior: Behavior is withdrawn. Thought Content: Thought content normal.         Cognition and Memory: Cognition normal.         Judgment: Judgment normal.         ASSESSMENT/PLAN:  1. Social anxiety disorder    2. Moderate episode of recurrent major depressive disorder ([facility] [address]    3.  Anxiety  - hydrOXYzine (ATARAX) 10 MG tablet; Take 1-2 tablets by mouth every 6 hours as needed for Anxiety  Dispense: 40 tablet; Refill: 3    - Pt back to work on 7/22/21  - Continue current medications and follow up with counseling as planned  - Call office with any questions or concerns, or if symptoms are getting worse or changing      Return in about 2 months (around 11/17/2021), or if symptoms worsen or fail to improve, for Routine follow up, Medication check, Result discussion. Patient given educational materials - see patient instructions. Discussed use, benefit, and side effects of prescribed medications. All patient questions answered. Pt voiced understanding. Reviewed health maintenance. An electronic signature was used to authenticate this note.     --NATALIE Oropeza - CNP on 9/17/2021 at 9:32 AM

## 2021-09-17 NOTE — PATIENT INSTRUCTIONS
Patient Education        Learning About Anxiety Disorders  What are anxiety disorders? Anxiety disorders are a type of medical problem. They cause severe anxiety. When you feel anxious, you feel that something bad is about to happen. This feeling interferes with your life. These disorders include:  · Generalized anxiety disorder. You feel worried and stressed about many everyday events and activities. This goes on for several months and disrupts your life on most days. · Panic disorder. You have repeated panic attacks. A panic attack is a sudden, intense fear or anxiety. It may make you feel short of breath. Your heart may pound. · Social anxiety disorder. You feel very anxious about what you will say or do in front of people. For example, you may be scared to talk or eat in public. This problem affects your daily life. · Phobias. You are very scared of a specific object, situation, or activity. For example, you may fear spiders, high places, or small spaces. What are the symptoms? Generalized anxiety disorder  Symptoms may include:  · Feeling worried and stressed about many things almost every day. · Feeling tired or irritable. You may have a hard time concentrating. · Having headaches or muscle aches. · Having a hard time getting to sleep or staying asleep. Panic disorder  You may have repeated panic attacks when there is no reason for feeling afraid. You may change your daily activities because you worry that you will have another attack. Symptoms may include:  · Intense fear, terror, or anxiety. · Trouble breathing or very fast breathing. · Chest pain or tightness. · A heartbeat that races or is not regular. Social anxiety disorder  Symptoms may include:  · Fear about a social situation, such as eating in front of others or speaking in public. You may worry a lot. Or you may be afraid that something bad will happen. · Anxiety that can cause you to blush, sweat, and feel shaky.   · A heartbeat that is faster than normal.  · A hard time focusing. Phobias  Symptoms may include:  · More fear than most people of being around an object, being in a situation, or doing an activity. You might also be stressed about the chance of being around the thing you fear. · Worry about losing control, panicking, fainting, or having physical symptoms like a faster heartbeat when you are around the situation or object. How are these disorders treated? Anxiety disorders can be treated with medicines or counseling. A combination of both may be used. Medicines may include:  · Antidepressants. These may help your symptoms by keeping chemicals in your brain in balance. · Benzodiazepines. These may give you short-term relief of your symptoms. Some people use cognitive-behavioral therapy. A therapist helps you learn to change stressful or bad thoughts into helpful thoughts. Lead a healthy lifestyle  A healthy lifestyle may help you feel better. · Get at least 30 minutes of exercise on most days of the week. Walking is a good choice. · Eat a healthy diet. Include fruits, vegetables, lean proteins, and whole grains in your diet each day. · Try to go to bed at the same time every night. Try for 8 hours of sleep a night. · Find ways to manage stress. Try relaxation exercises. · Avoid alcohol and illegal drugs. Follow-up care is a key part of your treatment and safety. Be sure to make and go to all appointments, and call your doctor if you are having problems. It's also a good idea to know your test results and keep a list of the medicines you take. Where can you learn more? Go to https://caron.[employer]. org and sign in to your [employer] account. Enter U545 in the [employer] box to learn more about \"Learning About Anxiety Disorders. \"     If you do not have an account, please click on the \"Sign Up Now\" link.   Current as of: September 23, 2020               Content Version: 12.9  © 1620-1721 Healthwise, Incorporated. Care instructions adapted under license by [facility] ([facility]). If you have questions about a medical condition or this instruction, always ask your healthcare professional. [address] any warranty or liability for your use of this information.

## 2021-09-17 NOTE — LETTER
[facility] [facility] [address] [address]  Phone: [phone]  Fax: NATALIE Hernandze CNP        September 17, 2021     Patient: Elizabethton Ebbing   YOB: 1985   Date of Visit: 9/17/2021       To Whom It May Concern: It is my medical opinion that Yong Mcdonnell may return to work on 7/22/2021. If you have any questions or concerns, please don't hesitate to call.     Sincerely,        NATALIE Bai CNP

## 2021-09-17 NOTE — LETTER
[address] [address]  Phone: [phone]  Fax: NATALIE Hernandez CNP        September 17, 2021     Patient: Susana Gunter   YOB: 1985   Date of Visit: 9/17/2021       To Whom It May Concern: It is my medical opinion that Bipin Lara may return to work on 9/22/2021. If you have any questions or concerns, please don't hesitate to call.     Sincerely,        NATALIE Ibrahim CNP

## 2021-10-06 ENCOUNTER — OFFICE VISIT (OUTPATIENT)
Dept: BEHAVIORAL/MENTAL HEALTH CLINIC | Age: 36
End: 2021-10-06
Payer: COMMERCIAL

## 2021-10-06 DIAGNOSIS — F32.A DEPRESSION, UNSPECIFIED DEPRESSION TYPE: ICD-10-CM

## 2021-10-06 DIAGNOSIS — F41.1 GENERALIZED ANXIETY DISORDER: Primary | ICD-10-CM

## 2021-10-06 PROCEDURE — 90834 PSYTX W PT 45 MINUTES: CPT | Performed by: SOCIAL WORKER

## 2021-10-06 NOTE — PATIENT INSTRUCTIONS
Affirmations for Anxiety  My anxious thoughts aren't facts  I am taking this day one moment at a time  I will practice moments of mindfulness  Peace starts within me  I will do my best to stay in the present moment  I can sit with my fear and anxiety  I will acknowledge my anxious thoughts instead of pushing them away  I can take myself out of situations triggering my anxiety  My anxiety isn't a weakness or a character flaw    -Mireille Hollow  2. Pt was advised of indications of depressive symptoms and instructed to monitor if symptoms worsen or interfere with daily functioning, to consider following-up with either your primary care team or a behavioral health provider for possible counseling or medication management. If suicidal thoughts are experienced, call 911. An additional 24/7 resource is the [employer] at 3-091-942-DSFC ([phone]). 3. Pt will build daily/weekly structure and routine. 4. [facility] service in [facility]. org  Address: [address] [place] [address], [facility] [facility]  Open ? Closes 5PM  Phone: [phone]    [facility]  Address: [address] [facility], [address] [place] [address]  Open ? Closes 4:30PM  Phone: [phone]    [address] in [facility]  Address: [address]   Open ? Closes 5PM  Phone: [phone]    5.    Constructive Worry Worksheet  Concerns Solutions     1. ________________________                        2.  ______________________                        3.  _______________________     1. __________________________        2. __________________________        3. __________________________        1.  _________________________        2. __________________________        3. __________________________        1. __________________________        2. __________________________        3.  __________________________ Constructive Worry Instructions  When we have challenges, we tend to use our problem-solving skills to make our lives better and to relieve ourselves of anxiety. It is not surprising that some of us may use our problem-solving skills at the wrong time and place, namely bedtime. We may think about a problem, trying to solve it, but unfortunately this will oftentimes keep us awake. Constructive worry is a method for managing the tendency to worry during that quiet time when sleep is supposed to be taking over. Do this exercise early in the evening (at least 2 hours before bed). It should take only about 15 minutes to complete. Here is how it is done:  1. Write down the problem(s) facing you that has the greatest chance of keeping you awake a bedtime, and list them in the Concerns column of the [address]. 2. Then, think of the next step that might help fix it. Write it down in the Solutions column. This need not be the final solution to the problem, since most problems have to be solved by taking steps anyhow, and you will be doing this again tomorrow night and the night after until you finally get to the best solution.  If you know how to fix the problem completely, then write that down.  If you decide that this is not really a big problem, and you will just deal with it when the time comes, then write that down.  If you decide that you simply do not know what to do about it, and need to ask someone to help you, write that down.  If you decide that it is a problem, but there seems to be no good solution at all, and that you will just have to live with it, write that down, with a note to yourself that maybe sometime soon you or someone you speak with will give you a clue that will lead you to a solution. 3. Repeat this for any other concerns you have.     4. Fold the Constructive Worry Worksheet in half and place it on the nightstand next to your bed and forget about

## 2021-10-07 NOTE — PSYCHOTHERAPY
Behavioral Health Consultation  GUADALUPE Khan  10/6/2021  9:35 AM EDT      Time spent with Patient: 38 minutes  This is patient's fourth [facility] appointment. Reason for Consult:    Chief Complaint   Patient presents with    Anxiety    Depression     Referring Provider: No referring provider defined for this encounter. Pt provided informed consent for the behavioral health program. Discussed with patient model of service to include the limits of confidentiality (i.e. abuse reporting, suicide intervention, etc.) and short-term intervention focused approach. Pt indicated understanding. Feedback given to PCP. S:  Pt and his mother assessed slight improvements in management of symptoms of high anxiety. Pt determined that he made a decision to end his work at [employer], with his last day being this week and expressed being settled that this was the best choice for him at this time. He acknowledged that thoughts about seeking a new employer has been the most prevalent trigger to his anxiety. Pt was guided in examining his thoughts, feelings, needs, and desires regarding returning to the workforce and steps necessary towards achieving this. He recognized that his thinking relating to this can often be inflexible and black and white. Pt assessed how this can be a challenge and triggering to his indecisiveness. He processed coping strategies that have been effective in decreasing ruminating tendencies. Pt was guided in processing his emotional response and logical reasoning attributed to each step of obtaining employment. He expressed that  sleep problems and bizarre dreams have increased and reviewed his sleep hygiene plans and the influence of these dreams on him. Pt denied thoughts of suicide.  Pt was instructed to monitor if symptoms worsen or interfere with daily functioning, to consider following-up with either your primary care team or a behavioral health provider for possible counseling or medication management. If suicidal thoughts are experienced, call 911. An additional 24/7 resource is the Jennifer 10 at [phone]-JWJN ([phone]). No further appointments will be scheduled. Pt will contact the office to schedule an appointment if needed. Pt will follow up on referral for further mental health assessments. O:  MSE:    Appearance    cooperative  Appetite abnormal: poor  Sleep disturbance Yes  Fatigue No  Loss of pleasure Yes  Impulsive behavior No  Speech    normal rate  Mood    Anxious  euthymic   Affect    anxiety  Thought Content    intact  Thought Process    coherent  Associations    logical connections  Insight    Fair  Judgment    Intact  Orientation    oriented to person, place, time, and general circumstances  Memory    recent and remote memory intact  Attention/Concentration    intact  Morbid ideation No  Suicide Assessment    no suicidal ideation    History:    TOBACCO:   reports that he has never smoked. He has never used smokeless tobacco.  ETOH:   reports current alcohol use. Family History:   Family History   Problem Relation Age of Onset   [place]  Mental Illness Mother     Mental Illness Father     Mental Illness Sister        A:    Diagnosis:    1. Generalized anxiety disorder    2. Depression, unspecified depression type      No past medical history on file. Plan: No further appointments will be scheduled. Pt will contact the office to schedule an appointment if needed. Pt interventions:  Provided handout on  anxiety, Trained in relaxation strategies and Discussed self-care (sleep, nutrition, rewarding activities, social support, exercise)    Pt Behavioral Change Plan:   1. Pt will disengage from anxiety provoking thoughts and distract from ruminating. 2. Pt will practice self calming skills 2-3x's daily for 3-5 minutes.   3. Pt will promote effective self care habits daily/weekly-rest, relaxation, stress management, supportive relationships, increased physical outlets, engagement in enjoyable activities. 4.No further appointments will be scheduled. Pt will contact the office to schedule an appointment if needed. 5. Pt was instructed to monitor if symptoms worsen or interfere with daily functioning, to consider following-up with either your primary care team or a behavioral health provider for possible counseling or medication management. If suicidal thoughts are experienced, call 911. An additional 24/7 resource is the [employer] [facility] [employer] [employer] at 3-317-906-FPBJ (2010). 6. Pt will follow up on referrals for further mental health assessments.

## 2021-11-19 DIAGNOSIS — F41.9 ANXIETY: ICD-10-CM

## 2021-11-19 DIAGNOSIS — F40.10 SOCIAL ANXIETY DISORDER: ICD-10-CM

## 2021-11-19 RX ORDER — ESCITALOPRAM OXALATE 10 MG/1
10 TABLET ORAL DAILY
Qty: 90 TABLET | Refills: 3 | Status: SHIPPED | OUTPATIENT
Start: 2021-11-19

## 2021-11-19 NOTE — TELEPHONE ENCOUNTER
Incoming fax received from [facility] pharmacy for refill of lexapro 10 mg qd. Last seen 9/17/21, no future appt scheduled. Med verified. Order pended.

## 2022-05-03 NOTE — PROGRESS NOTES
Behavioral Health Consultation  ELLIOT Oconnell-S  10/6/2021  9:35 AM EDT        Time spent with Patient: 38 minutes  This is patient's fourth [facility] appointment.     Reason for Consult:        Chief Complaint   Patient presents with    Anxiety    Depression      Referring Provider: No referring provider defined for this encounter.     Pt provided informed consent for the behavioral health program. Discussed with patient model of service to include the limits of confidentiality (i.e. abuse reporting, suicide intervention, etc.) and short-term intervention focused approach. Pt indicated understanding. Feedback given to PCP.     S:  Pt and his mother assessed slight improvements in management of symptoms of high anxiety. Pt determined that he made a decision to end his work at [employer], with his last day being this week and expressed being settled that this was the best choice for him at this time. He acknowledged that thoughts about seeking a new employer has been the most prevalent trigger to his anxiety. Pt was guided in examining his thoughts, feelings, needs, and desires regarding returning to the workforce and steps necessary towards achieving this. He recognized that his thinking relating to this can often be inflexible and black and white. Pt assessed how this can be a challenge and triggering to his indecisiveness. He processed coping strategies that have been effective in decreasing ruminating tendencies. Pt was guided in processing his emotional response and logical reasoning attributed to each step of obtaining employment. He expressed that  sleep problems and bizarre dreams have increased and reviewed his sleep hygiene plans and the influence of these dreams on him. Pt denied thoughts of suicide.  Pt was instructed to monitor if symptoms worsen or interfere with daily functioning, to consider following-up with either your primary care team or a behavioral health provider for possible counseling or medication management. If suicidal thoughts are experienced, call 911. An additional 24/7 resource is the [facility] at [phone]). No further appointments will be scheduled. Pt will contact the office to schedule an appointment if needed. Pt will follow up on referral for further mental health assessments.        O:  MSE:     Appearance    cooperative  Appetite abnormal: poor  Sleep disturbance Yes  Fatigue No  Loss of pleasure Yes  Impulsive behavior No  Speech    normal rate  Mood    Anxious  euthymic   Affect    anxiety  Thought Content    intact  Thought Process    coherent  Associations    logical connections  Insight    Fair  Judgment    Intact  Orientation    oriented to person, place, time, and general circumstances  Memory    recent and remote memory intact  Attention/Concentration    intact  Morbid ideation No  Suicide Assessment    no suicidal ideation     History:     TOBACCO:   reports that he has never smoked. He has never used smokeless tobacco.  ETOH:   reports current alcohol use. Family History:   Family History         Family History   Problem Relation Age of Onset    Mental Illness Mother      Mental Illness Father      Mental Illness Sister              A:     Diagnosis:     1. Generalized anxiety disorder    2. Depression, unspecified depression type       Past Medical History    No past medical history on file.        Plan: No further appointments will be scheduled. Pt will contact the office to schedule an appointment if needed.     Pt interventions:  Provided handout on  anxiety, Trained in relaxation strategies and Discussed self-care (sleep, nutrition, rewarding activities, social support, exercise)     Pt Behavioral Change Plan:   1. Pt will disengage from anxiety provoking thoughts and distract from ruminating. 2. Pt will practice self calming skills 2-3x's daily for 3-5 minutes.   3. Pt will promote effective self care habits daily/weekly-rest, relaxation, stress management, supportive relationships, increased physical outlets, engagement in enjoyable activities. 4.No further appointments will be scheduled. Pt will contact the office to schedule an appointment if needed. 5. Pt was instructed to monitor if symptoms worsen or interfere with daily functioning, to consider following-up with either your primary care team or a behavioral health provider for possible counseling or medication management. If suicidal thoughts are experienced, call 911. An additional 24/7 resource is the [facility] at [phone]).   6. Pt will follow up on referrals for further mental health assessments